# Patient Record
Sex: MALE | Race: WHITE | Employment: FULL TIME | URBAN - METROPOLITAN AREA
[De-identification: names, ages, dates, MRNs, and addresses within clinical notes are randomized per-mention and may not be internally consistent; named-entity substitution may affect disease eponyms.]

---

## 2019-10-14 ENCOUNTER — INITIAL CONSULT (OUTPATIENT)
Dept: PULMONOLOGY | Age: 59
End: 2019-10-14
Payer: COMMERCIAL

## 2019-10-14 VITALS
WEIGHT: 192 LBS | DIASTOLIC BLOOD PRESSURE: 76 MMHG | OXYGEN SATURATION: 94 % | SYSTOLIC BLOOD PRESSURE: 122 MMHG | BODY MASS INDEX: 25.45 KG/M2 | HEIGHT: 73 IN | HEART RATE: 73 BPM

## 2019-10-14 DIAGNOSIS — R91.1 LEFT LOWER LOBE PULMONARY NODULE: ICD-10-CM

## 2019-10-14 DIAGNOSIS — F17.210 CIGARETTE SMOKER: ICD-10-CM

## 2019-10-14 DIAGNOSIS — R06.02 SOB (SHORTNESS OF BREATH): ICD-10-CM

## 2019-10-14 PROCEDURE — 99204 OFFICE O/P NEW MOD 45 MIN: CPT | Performed by: INTERNAL MEDICINE

## 2019-10-14 RX ORDER — SIMVASTATIN 40 MG
TABLET ORAL
Refills: 0 | COMMUNITY
Start: 2019-08-30

## 2019-10-14 RX ORDER — ALFUZOSIN HYDROCHLORIDE 10 MG/1
TABLET, EXTENDED RELEASE ORAL
Refills: 0 | COMMUNITY
Start: 2019-10-09 | End: 2021-05-26

## 2019-10-14 RX ORDER — BUPROPION HYDROCHLORIDE 150 MG/1
TABLET ORAL
Refills: 0 | COMMUNITY
Start: 2019-08-29 | End: 2021-05-26

## 2019-10-14 ASSESSMENT — ENCOUNTER SYMPTOMS
ABDOMINAL DISTENTION: 0
SHORTNESS OF BREATH: 0
EYE DISCHARGE: 0
COUGH: 0
ABDOMINAL PAIN: 0
EYE ITCHING: 0
BACK PAIN: 0

## 2019-11-04 ENCOUNTER — TELEPHONE (OUTPATIENT)
Dept: PULMONOLOGY | Age: 59
End: 2019-11-04

## 2019-11-07 ENCOUNTER — HOSPITAL ENCOUNTER (OUTPATIENT)
Dept: CT IMAGING | Age: 59
Discharge: HOME | End: 2019-11-07
Payer: COMMERCIAL

## 2019-11-07 DIAGNOSIS — R91.1 LEFT LOWER LOBE PULMONARY NODULE: ICD-10-CM

## 2019-11-07 PROCEDURE — 71250 CT THORAX DX C-: CPT

## 2019-11-11 ENCOUNTER — OFFICE VISIT (OUTPATIENT)
Dept: PULMONOLOGY | Age: 59
End: 2019-11-11
Payer: COMMERCIAL

## 2019-11-11 ENCOUNTER — TELEPHONE (OUTPATIENT)
Dept: PULMONOLOGY | Age: 59
End: 2019-11-11

## 2019-11-11 VITALS
DIASTOLIC BLOOD PRESSURE: 78 MMHG | OXYGEN SATURATION: 97 % | HEART RATE: 72 BPM | WEIGHT: 190 LBS | SYSTOLIC BLOOD PRESSURE: 128 MMHG | BODY MASS INDEX: 25.18 KG/M2 | HEIGHT: 73 IN

## 2019-11-11 DIAGNOSIS — R91.1 LEFT LOWER LOBE PULMONARY NODULE: ICD-10-CM

## 2019-11-11 DIAGNOSIS — R06.02 SOB (SHORTNESS OF BREATH): ICD-10-CM

## 2019-11-11 DIAGNOSIS — F17.210 CIGARETTE SMOKER: ICD-10-CM

## 2019-11-11 PROCEDURE — G8484 FLU IMMUNIZE NO ADMIN: HCPCS | Performed by: INTERNAL MEDICINE

## 2019-11-11 PROCEDURE — 99214 OFFICE O/P EST MOD 30 MIN: CPT | Performed by: INTERNAL MEDICINE

## 2019-11-11 PROCEDURE — 4004F PT TOBACCO SCREEN RCVD TLK: CPT | Performed by: INTERNAL MEDICINE

## 2019-11-11 PROCEDURE — G8419 CALC BMI OUT NRM PARAM NOF/U: HCPCS | Performed by: INTERNAL MEDICINE

## 2019-11-11 PROCEDURE — G8427 DOCREV CUR MEDS BY ELIG CLIN: HCPCS | Performed by: INTERNAL MEDICINE

## 2019-11-11 PROCEDURE — 3017F COLORECTAL CA SCREEN DOC REV: CPT | Performed by: INTERNAL MEDICINE

## 2019-11-11 ASSESSMENT — ENCOUNTER SYMPTOMS
COUGH: 1
EYE ITCHING: 0
BACK PAIN: 0
SHORTNESS OF BREATH: 1
ABDOMINAL DISTENTION: 0
ABDOMINAL PAIN: 0
EYE DISCHARGE: 0

## 2020-02-19 ENCOUNTER — HOSPITAL ENCOUNTER (OUTPATIENT)
Dept: PULMONOLOGY | Age: 60
Discharge: HOME | End: 2020-02-19
Payer: COMMERCIAL

## 2020-02-19 LAB
DLCO %PRED: 41 %
DLCO PRED: NORMAL
DLCO/VA %PRED: NORMAL
DLCO/VA PRED: NORMAL
DLCO/VA: NORMAL
DLCO: NORMAL
EXPIRATORY TIME-POST: NORMAL
EXPIRATORY TIME: NORMAL
FEF 25-75% %CHNG: NORMAL
FEF 25-75% %PRED-POST: NORMAL
FEF 25-75% %PRED-PRE: NORMAL
FEF 25-75% PRED: NORMAL
FEF 25-75%-POST: NORMAL
FEF 25-75%-PRE: NORMAL
FEV1 %PRED-POST: 71 %
FEV1 %PRED-PRE: 55 %
FEV1 PRED: NORMAL
FEV1-POST: NORMAL
FEV1-PRE: NORMAL
FEV1/FVC %PRED-POST: NORMAL
FEV1/FVC %PRED-PRE: NORMAL
FEV1/FVC PRED: NORMAL
FEV1/FVC-POST: 91 %
FEV1/FVC-PRE: 74 %
FVC %PRED-POST: NORMAL
FVC %PRED-PRE: NORMAL
FVC PRED: NORMAL
FVC-POST: NORMAL
FVC-PRE: NORMAL
GAW %PRED: NORMAL
GAW PRED: NORMAL
GAW: NORMAL
IC %PRED: NORMAL
IC PRED: NORMAL
IC: NORMAL
MEP: NORMAL
MIP: NORMAL
MVV %PRED-PRE: NORMAL
MVV PRED: NORMAL
MVV-PRE: NORMAL
PEF %PRED-POST: NORMAL
PEF %PRED-PRE: NORMAL
PEF PRED: NORMAL
PEF%CHNG: NORMAL
PEF-POST: NORMAL
PEF-PRE: NORMAL
RAW %PRED: NORMAL
RAW PRED: NORMAL
RAW: NORMAL
RV %PRED: NORMAL
RV PRED: NORMAL
RV: NORMAL
SVC %PRED: NORMAL
SVC PRED: NORMAL
SVC: NORMAL
TLC %PRED: 101 %
TLC PRED: NORMAL
TLC: NORMAL
VA %PRED: NORMAL
VA PRED: NORMAL
VA: NORMAL
VTG %PRED: NORMAL
VTG PRED: NORMAL
VTG: NORMAL

## 2020-02-19 PROCEDURE — 94060 EVALUATION OF WHEEZING: CPT

## 2020-02-19 PROCEDURE — 94727 GAS DIL/WSHOT DETER LNG VOL: CPT

## 2020-02-19 PROCEDURE — 94729 DIFFUSING CAPACITY: CPT

## 2020-02-19 ASSESSMENT — PULMONARY FUNCTION TESTS
FEV1/FVC_PRE: 74
FEV1_PERCENT_PREDICTED_POST: 71
FEV1/FVC_POST: 91
FEV1_PERCENT_PREDICTED_PRE: 55

## 2023-04-20 NOTE — PROGRESS NOTES
IR Procedure at [facility]:  Left message for patient to arrive at 1300 at [facility] on 4/21/2023 for his procedure at 1430. Also went over below instructions. (Paracentesis, Thoracentesis, Thyroid Bx and Injections may have a light breakfast)  2. Follow your directions as prescribed by the doctor for your procedure and medications. 3.   Consult your provider as to when to stop blood thinner  4. Do not take any pain medication within 6 hours of your procedure  5. Do not drink any alcoholic beverages or use any street drugs 24 hours before procedure. 6.   Please wear simple, loose fitting clothing to the hospital.  Do not bring valuables (money,             credit cards, checkbooks, etc.)     7. If you  have a Living Will and Durable Power of  for Healthcare, please bring in a copy. 8.   Please bring picture ID,  insurance card, paperwork from the doctors office            (H & P, Consent,  & card for implantable devices). 9.   Report to the information desk on the ground floor. 10. Take a shower the night before or morning of your procedure, do not apply any lotion, oil or powder. 11. If you are going to be sedated for the procedure, you will need a responsible adult to drive you home.

## 2023-04-21 ENCOUNTER — HOSPITAL ENCOUNTER (OUTPATIENT)
Dept: GENERAL RADIOLOGY | Age: 63
Discharge: HOME | End: 2023-04-21
Payer: COMMERCIAL

## 2023-04-21 ENCOUNTER — HOSPITAL ENCOUNTER (OUTPATIENT)
Dept: INTERVENTIONAL RADIOLOGY/VASCULAR | Age: 63
Discharge: HOME | End: 2023-04-21
Payer: COMMERCIAL

## 2023-04-21 VITALS
TEMPERATURE: 97 F | DIASTOLIC BLOOD PRESSURE: 76 MMHG | BODY MASS INDEX: 23.03 KG/M2 | SYSTOLIC BLOOD PRESSURE: 140 MMHG | OXYGEN SATURATION: 96 % | HEART RATE: 60 BPM | WEIGHT: 170 LBS | RESPIRATION RATE: 18 BRPM | HEIGHT: 72 IN

## 2023-04-21 DIAGNOSIS — J90 PLEURAL EFFUSION: ICD-10-CM

## 2023-04-21 LAB
APTT: 39.8 SECONDS (ref 25.1–37.1)
HCT VFR BLD CALC: 32.9 % (ref 42–52)
HEMOGLOBIN: 10.8 GM/DL (ref 13.5–18)
INR BLD: 1.03 INDEX
MCH RBC QN AUTO: 31 PG (ref 27–31)
MCHC RBC AUTO-ENTMCNC: 32.8 % (ref 32–36)
MCV RBC AUTO: 94.5 FL (ref 78–100)
PDW BLD-RTO: 12.9 % (ref 11.7–14.9)
PLATELET # BLD: 260 K/CU MM (ref 140–440)
PMV BLD AUTO: 9.4 FL (ref 7.5–11.1)
PROTHROMBIN TIME: 13.1 SECONDS (ref 11.7–14.5)
RBC # BLD: 3.48 M/CU MM (ref 4.6–6.2)
WBC # BLD: 6.8 K/CU MM (ref 4–10.5)

## 2023-04-21 PROCEDURE — 32555 ASPIRATE PLEURA W/ IMAGING: CPT

## 2023-04-21 PROCEDURE — 85027 COMPLETE CBC AUTOMATED: CPT

## 2023-04-21 PROCEDURE — 85610 PROTHROMBIN TIME: CPT

## 2023-04-21 PROCEDURE — 6360000002 HC RX W HCPCS

## 2023-04-21 PROCEDURE — 71045 X-RAY EXAM CHEST 1 VIEW: CPT

## 2023-04-21 PROCEDURE — C1729 CATH, DRAINAGE: HCPCS

## 2023-04-21 PROCEDURE — 85730 THROMBOPLASTIN TIME PARTIAL: CPT

## 2023-04-21 RX ORDER — SODIUM CHLORIDE 0.9 % (FLUSH) 0.9 %
10 SYRINGE (ML) INJECTION PRN
Status: DISCONTINUED | OUTPATIENT
Start: 2023-04-21 | End: 2023-04-22 | Stop reason: HOSPADM

## 2023-04-21 ASSESSMENT — PAIN SCALES - GENERAL
PAINLEVEL_OUTOF10: 0

## 2023-04-21 ASSESSMENT — PAIN - FUNCTIONAL ASSESSMENT: PAIN_FUNCTIONAL_ASSESSMENT: 0-10

## 2023-04-21 NOTE — PROGRESS NOTES
1442- Pt returned to SDS rm 18, respirations even and unlabored, VSS, pt alert and oriented, denies further need at this time. Dressing to L back clean, dry and intact   1445- Beverage and crackers provided to pt, tolerating well   [phone]- Radiology notified pt needs CXR   [phone]- Pt Denies pain and nausea at this time   1520- This nurse called radiology and left message for CXR to be completed.    [phone]- Radiology called and stated they were on their way    1550- radiology at bedside completing CXR   [phone]- This nurse attempted to call IR to read CXR   53 [facility] notified this nurse, Dr. Shannon Gross read CXR and gave discharge order   [phone]- Discharge instructions reviewed w/ pt, verbalized understanding, no questions at this time   [phone]- Pt dressing   [phone] Pt discharged

## 2023-04-21 NOTE — OR NURSING
PROCEDURE PERFORMED: left thoracentesis    PRIMARY INDICATION FOR PROCEDURE: pleural effusion    INFORMED CONSENT:  Obtained prior to procedure. Consent placed in chart. ALYCE SCORE PRE PROCEDURE:  10      PT TRANSPORTED FROM:  [facility]                                  TO THE IR ROOM:  IR large room                     ASSESSMENT: WDL    BARRIER PRECAUTIONS & STERILE TECHNIQUE:               Pt arrived to IR large room in bed and will stay in bed for the procedure. Pt placed on Cardiac Monitor. Pt and draped in a sterile fashion with chlorhexadine by Viacna Spangler RN    PAIN/LOCAL ANESTHESIA/SEDATION MANAGEMENT:           Local: Lidocaine 1% given by Dr Harshad Rausch          Sedation:              Fentanyl:             Versed:     INTRAOPERATIVE:           ACCESS TIME: 1418          US/FLUORO: 3 images          WIRE USED:           SHEATH USED:           CATHETER USED: one step          FINAL IMAGE TAKEN TO CONFIRM PLACEMENT OF:           CONTRAST/CC:     STERILE DRESSINGS: Dry sterile dressing was applied to the left back.     SPECIMENS: 700cc of serous sanguineous fluid was sent to the lab. 1100cc of fluid was collected all together     EBL:    less than 1cc      FOLLOW- UP X-RAY: STAT chest X-Ray was ordered    COMPLICATIONS/ OUTCOME: Patient tolerated procedure well without any complications    ALYCE SCORE POST PROCEDURE:   10       REPORT CALLED TO: Bedside report given in [facility]

## 2023-04-21 NOTE — H&P
pain      tiZANidine (ZANAFLEX) 2 MG tablet take 1 tablet by mouth three times a day if needed      simvastatin (ZOCOR) 40 MG tablet take 1 tablet by mouth once daily  0     No current facility-administered medications on file prior to encounter. ROS: No fevers or chills    Vital Signs: Body mass index is 23.06 kg/m². Laboratory:  Recent Labs     04/21/23  1325   WBC 6.8   INR 1.03     INR @LABR24(INR)@    Physical Exam:  GENERAL:Well developed, well nourished in NAD  NECK: Neck exam - No JVD,HJR or carotid bruit, no thyromegaly   RESPIRATORY:Clear to auscultation. Decreased breath sounds on the left. HEART:RRR,no murmer, gallop or friction rub  ABDOMEN: Bowel sounds present, no tenderness to palpation. No organomegaly  EXTREMITIES: no edema or cyanosis  VASCULAR:  no ischemic changes  SKIN: no erythema, rubor or lesions noted  NEURO/PSYCH:Alert and oriented    EKG:    Imaging:    Chest: CTA    Heart: S1, S1    Impression:  Active Problems:    * No active hospital problems. *  Resolved Problems:    * No resolved hospital problems.  *       Left pleural effusion    Mallampati Score 2  ASA class 2    PLAN OF CARE/PLANNED PROCEDURE    IR THORACENTESIS PLEURAL W IMAGING [00809]

## 2023-04-21 NOTE — DISCHARGE INSTRUCTIONS
Watch for signs of infection    Elevated temperature, redness or pain at site,   Drainage at incision,      Advance Care Planning  People with COVID-19 may have no symptoms, mild symptoms, such as fever, cough, and shortness of breath or they may have more severe illness, developing severe and fatal pneumonia. As a result, Advance Care Planning with attention to naming a health care decision maker (someone you trust to make healthcare decisions for you if you could not speak for yourself) and sharing other health care preferences is important BEFORE a possible health crisis. Please contact your Primary Care Provider to discuss Advance Care Planning. Preventing the Spread of Coronavirus Disease 2019 in Homes and Residential Communities  For the most recent information go to [employer].    Prevention steps for People with confirmed or suspected COVID-19 (including persons under investigation) who do not need to be hospitalized  and   People with confirmed COVID-19 who were hospitalized and determined to be medically stable to go home    Your healthcare provider and public health staff will evaluate whether you can be cared for at home. If it is determined that you do not need to be hospitalized and can be isolated at home, you will be monitored by staff from your local or state health department. You should follow the prevention steps below until a healthcare provider or local or state health department says you can return to your normal activities. Stay home except to get medical care  People who are mildly ill with COVID-19 are able to isolate at home during their illness. You should restrict activities outside your home, except for getting medical care. Do not go to work, school, or public areas. Avoid using public transportation, ride-sharing, or taxis.   Separate yourself from other people and animals in your home  People: As much as possible, you

## 2023-07-18 NOTE — PROGRESS NOTES
Patient Name:  He Dior  Patient :  1960  Patient MRN:  [id number]     Primary Oncologist: Nicole Malik MD  Referring Provider: Venu Royal MD     Date of Service: 2023      Reason for Consult:  recurrent clear cell ca     Chief Complaint:    Chief Complaint   Patient presents with    New Patient        Patient Active Problem List:     Cigarette smoker     Left lower lobe pulmonary nodule     SOB (shortness of breath)    HPI:   He Dior is a pleasant 58 yo male patient who was referred for evaluation recurrent clear cell ca. 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 He has been followed by Dr Maddy Silverman locally. 2023 CXR: Moderate left pleural effusion. No pneumothorax. Atelectasis or infiltrate in the left lung base. Underlying mass cannot be entirely excluded. Follow up to resolution is suggested. 2023 Left Pleural Fluid cytology with cell block:   -  Negative for malignancy. -  Benign mesothelial cells, mixed inflammation, and macrophages. Due to left chest pain he has work up. D-dimer was elevated, hence he had CTA chest on 2023 IR lung biopsy:  Final Pathologic Diagnosis:   A. Epicardial fat mass; ultrasound guided needle biopsy:   -     METASTATIC RENAL CELL CARCINOMA. B. Epicardial fat mass; ultrasound guided aspirate for cell block:   -     Mostly blood and rare tumor cells  Fluid cytology from left pleural effusion in progress. Clinically he is not candidate for metastasectomy. He has intermediate prognostic factors. He has Hg  10.4. PS > 80%. WBC, plat, LD and calcium wnl. Recurrrent after >1 year. I will look for clinical trial and order CARIS study. We discuss about preferred choice of treatment based on NCCN. I offer axitinib and pembroluzimab. Potential SE discuss with him.   If there is no recent CT AP, I will order

## 2023-07-21 NOTE — PROGRESS NOTES
IR Procedure at [facility]:  Left message for patient to arrive at [facility] on 7/26/2023 at 1100 for his procedure at 1230. Also went over below instructions. NPO at [address]      2. Follow your directions as prescribed by the doctor for your procedure and medications. 3.   Consult your provider as to when to stop blood thinner  4. Do not take any pain medication within 6 hours of your procedure  5. Do not drink any alcoholic beverages or use any street drugs 24 hours before procedure. 6.   Please wear simple, loose fitting clothing to the hospital.  Do not bring valuables (money,             credit cards, checkbooks, etc.)     7. If you  have a Living Will and Durable Power of  for Healthcare, please bring in a copy. 8.   Please bring picture ID,  insurance card, paperwork from the doctors office            (H & P, Consent,  & card for implantable devices). 9.   Report to the information desk on the ground floor. 10. Take a shower the night before or morning of your procedure, do not apply any lotion, oil or powder. 11. If you are going to be sedated for the procedure, you will need a responsible adult to drive you home.

## 2023-07-26 ENCOUNTER — HOSPITAL ENCOUNTER (OUTPATIENT)
Dept: CT IMAGING | Age: 63
Discharge: HOME | End: 2023-07-26
Payer: COMMERCIAL

## 2023-07-26 ENCOUNTER — HOSPITAL ENCOUNTER (OUTPATIENT)
Dept: GENERAL RADIOLOGY | Age: 63
Discharge: HOME | End: 2023-07-26
Payer: COMMERCIAL

## 2023-07-26 ENCOUNTER — HOSPITAL ENCOUNTER (OUTPATIENT)
Dept: INTERVENTIONAL RADIOLOGY/VASCULAR | Age: 63
Discharge: HOME | End: 2023-07-26
Payer: COMMERCIAL

## 2023-07-26 VITALS
HEART RATE: 90 BPM | BODY MASS INDEX: 21.87 KG/M2 | TEMPERATURE: 97.2 F | WEIGHT: 165 LBS | SYSTOLIC BLOOD PRESSURE: 119 MMHG | DIASTOLIC BLOOD PRESSURE: 70 MMHG | OXYGEN SATURATION: 96 % | HEIGHT: 73 IN | RESPIRATION RATE: 16 BRPM

## 2023-07-26 VITALS
SYSTOLIC BLOOD PRESSURE: 125 MMHG | OXYGEN SATURATION: 98 % | RESPIRATION RATE: 16 BRPM | DIASTOLIC BLOOD PRESSURE: 64 MMHG | HEART RATE: 60 BPM

## 2023-07-26 DIAGNOSIS — R91.8 LUNG MASS: ICD-10-CM

## 2023-07-26 DIAGNOSIS — R91.1 LUNG NODULE: ICD-10-CM

## 2023-07-26 LAB
APTT: 33.8 SECONDS (ref 25.1–37.1)
HCT VFR BLD CALC: 31.9 % (ref 42–52)
HEMOGLOBIN: 10.1 GM/DL (ref 13.5–18)
INR BLD: 1.1 INDEX
MCH RBC QN AUTO: 30.1 PG (ref 27–31)
MCHC RBC AUTO-ENTMCNC: 31.7 % (ref 32–36)
MCV RBC AUTO: 94.9 FL (ref 78–100)
PDW BLD-RTO: 13 % (ref 11.7–14.9)
PLATELET # BLD: 248 K/CU MM (ref 140–440)
PMV BLD AUTO: 9.3 FL (ref 7.5–11.1)
PROTHROMBIN TIME: 14.8 SECONDS (ref 11.7–14.5)
RBC # BLD: 3.36 M/CU MM (ref 4.6–6.2)
WBC # BLD: 6.2 K/CU MM (ref 4–10.5)

## 2023-07-26 PROCEDURE — 2500000003 HC RX 250 WO HCPCS: Performed by: RADIOLOGY

## 2023-07-26 PROCEDURE — C1729 CATH, DRAINAGE: HCPCS

## 2023-07-26 PROCEDURE — 71045 X-RAY EXAM CHEST 1 VIEW: CPT

## 2023-07-26 PROCEDURE — 32555 ASPIRATE PLEURA W/ IMAGING: CPT

## 2023-07-26 PROCEDURE — 88305 TISSUE EXAM BY PATHOLOGIST: CPT | Performed by: PATHOLOGY

## 2023-07-26 PROCEDURE — 85730 THROMBOPLASTIN TIME PARTIAL: CPT

## 2023-07-26 PROCEDURE — 88333 PATH CONSLTJ SURG CYTO XM 1: CPT | Performed by: PATHOLOGY

## 2023-07-26 PROCEDURE — 88342 IMHCHEM/IMCYTCHM 1ST ANTB: CPT | Performed by: PATHOLOGY

## 2023-07-26 PROCEDURE — 32408 CORE NDL BX LNG/MED PERQ: CPT

## 2023-07-26 PROCEDURE — 88341 IMHCHEM/IMCYTCHM EA ADD ANTB: CPT | Performed by: PATHOLOGY

## 2023-07-26 PROCEDURE — 85610 PROTHROMBIN TIME: CPT

## 2023-07-26 PROCEDURE — 85027 COMPLETE CBC AUTOMATED: CPT

## 2023-07-26 PROCEDURE — 88173 CYTOPATH EVAL FNA REPORT: CPT | Performed by: PATHOLOGY

## 2023-07-26 RX ORDER — LIDOCAINE HYDROCHLORIDE 10 MG/ML
INJECTION, SOLUTION EPIDURAL; INFILTRATION; INTRACAUDAL; PERINEURAL PRN
Status: COMPLETED | OUTPATIENT
Start: 2023-07-26 | End: 2023-07-26

## 2023-07-26 RX ORDER — TRAMADOL HYDROCHLORIDE 50 MG/1
50 TABLET ORAL EVERY 6 HOURS PRN
COMMUNITY

## 2023-07-26 RX ADMIN — LIDOCAINE HYDROCHLORIDE 10 MG: 10 INJECTION, SOLUTION EPIDURAL; INFILTRATION; INTRACAUDAL; PERINEURAL at 13:29

## 2023-07-26 RX ADMIN — LIDOCAINE HYDROCHLORIDE 10 MG: 10 INJECTION, SOLUTION EPIDURAL; INFILTRATION; INTRACAUDAL; PERINEURAL at 13:56

## 2023-07-26 ASSESSMENT — PAIN SCALES - GENERAL
PAINLEVEL_OUTOF10: 0
PAINLEVEL_OUTOF10: 0

## 2023-07-26 ASSESSMENT — PAIN - FUNCTIONAL ASSESSMENT: PAIN_FUNCTIONAL_ASSESSMENT: 0-10

## 2023-07-26 NOTE — PROGRESS NOTES
1410 Pt. Brought back to unit, bedside report received from [address] Pt. Denies pain, SOB, or chest pain. VSS and bandaid to L front chest Is C/D/I. 1412 Pt. Provided with beverage and crackers. 1415 Chest xray obtained. 56 Dr. Anyia Harrington calledasad for pt. To DC home. Sarah instructions reviewed with pt, pt expresses understanding. 1500 Pt. To DC home in private vehicle with brother in law.

## 2023-07-26 NOTE — PROGRESS NOTES
Left lung biopsy complete by Dr. Angel Wilson, specimen sent to lab with cytology. Bandaid in place clean dry and intact.

## 2023-07-26 NOTE — PROGRESS NOTES
Thoracentesis complete by Dr. Mcgill Horse, 950 cc blood tinged pleural fluid removed. Tegaderm in place clean dry and intact. Pt tolerated procedure well, VSS STAT chest x ray order placed.

## 2023-07-26 NOTE — DISCHARGE INSTRUCTIONS
Thoracentesis: What to Expect at Home  Your Recovery    Thoracentesis (say \"manj-nz-pli-JORGE-sis\") is a procedure to remove fluid from the space between the lungs and the chest wall (pleural space). This procedure may also be called a \"chest tap. \" It is normal to have a small amount of fluid in the pleural space. But too much fluid can build up because of problems such as infection, heart failure, or lung cancer. The procedure may have been done to help with shortness of breath and pain caused by the fluid buildup. Or you may have had this procedure so the doctor could test the fluid to find the cause of the buildup. Your chest may be sore where the doctor put the needle or catheter into your skin (the puncture site). This usually gets better after a day or two. You can go back to work or your normal activities as soon as you feel up to it. If the doctor sent the fluid to a lab for testing, it may take several days to get the results. The doctor or nurse will discuss the results with you. This care sheet gives you a general idea about how long it will take for you to recover. But each person recovers at a different pace. Follow the steps below to feel better as quickly as possible. How can you care for yourself at home? Activity    Rest when you feel tired. Getting enough sleep will help you recover. Avoid strenuous activities, such as bicycle riding, jogging, weight lifting, or aerobic exercise, until your doctor says it is okay. You may shower. Do not take a bath until the puncture site has healed, or until your doctor tells you it is okay. Ask your doctor when you can drive again. You may need to take 1 or 2 days off from work. It depends on the type of work you do and how you feel. Diet    You can eat your normal diet. Drink plenty of fluids (unless your doctor tells you not to). Medicines    Take pain medicines exactly as directed.   If the doctor gave you a prescription medicine for pain, take it

## 2023-08-01 ENCOUNTER — HOSPITAL ENCOUNTER (OUTPATIENT)
Dept: INFUSION THERAPY | Age: 63
Discharge: HOME | End: 2023-08-01
Payer: COMMERCIAL

## 2023-08-01 ENCOUNTER — INITIAL CONSULT (OUTPATIENT)
Dept: ONCOLOGY | Age: 63
End: 2023-08-01
Payer: COMMERCIAL

## 2023-08-01 VITALS
WEIGHT: 155 LBS | BODY MASS INDEX: 20.54 KG/M2 | HEART RATE: 69 BPM | TEMPERATURE: 97.7 F | HEIGHT: 73 IN | SYSTOLIC BLOOD PRESSURE: 111 MMHG | DIASTOLIC BLOOD PRESSURE: 65 MMHG | OXYGEN SATURATION: 97 %

## 2023-08-01 DIAGNOSIS — C64.2 RENAL CELL CARCINOMA OF LEFT KIDNEY (HCC): ICD-10-CM

## 2023-08-01 DIAGNOSIS — Z11.59 SCREENING FOR VIRAL DISEASE: ICD-10-CM

## 2023-08-01 DIAGNOSIS — C64.2 RENAL CELL CARCINOMA OF LEFT KIDNEY (HCC): Primary | ICD-10-CM

## 2023-08-01 DIAGNOSIS — R53.82 CHRONIC FATIGUE: ICD-10-CM

## 2023-08-01 LAB
ALBUMIN SERPL-MCNC: 4.2 GM/DL (ref 3.4–5)
ALP BLD-CCNC: 96 IU/L (ref 40–129)
ALT SERPL-CCNC: 22 U/L (ref 10–40)
ANION GAP SERPL CALCULATED.3IONS-SCNC: 11 MMOL/L (ref 4–16)
AST SERPL-CCNC: 22 IU/L (ref 15–37)
BASOPHILS ABSOLUTE: 0.1 K/CU MM
BASOPHILS RELATIVE PERCENT: 0.9 % (ref 0–1)
BILIRUB SERPL-MCNC: 0.2 MG/DL (ref 0–1)
BUN SERPL-MCNC: 20 MG/DL (ref 6–23)
CALCIUM SERPL-MCNC: 9.3 MG/DL (ref 8.3–10.6)
CHLORIDE BLD-SCNC: 103 MMOL/L (ref 99–110)
CO2: 25 MMOL/L (ref 21–32)
CREAT SERPL-MCNC: 1.2 MG/DL (ref 0.9–1.3)
DIFFERENTIAL TYPE: ABNORMAL
EOSINOPHILS ABSOLUTE: 0.2 K/CU MM
EOSINOPHILS RELATIVE PERCENT: 3.8 % (ref 0–3)
GFR SERPL CREATININE-BSD FRML MDRD: >60 ML/MIN/1.73M2
GLUCOSE SERPL-MCNC: 106 MG/DL (ref 70–99)
HCT VFR BLD CALC: 32.8 % (ref 42–52)
HEMOGLOBIN: 10.4 GM/DL (ref 13.5–18)
LACTATE DEHYDROGENASE: 149 IU/L (ref 120–246)
LYMPHOCYTES ABSOLUTE: 1.8 K/CU MM
LYMPHOCYTES RELATIVE PERCENT: 28.4 % (ref 24–44)
MCH RBC QN AUTO: 29.6 PG (ref 27–31)
MCHC RBC AUTO-ENTMCNC: 31.7 % (ref 32–36)
MCV RBC AUTO: 93.4 FL (ref 78–100)
MONOCYTES ABSOLUTE: 0.7 K/CU MM
MONOCYTES RELATIVE PERCENT: 10.2 % (ref 0–4)
PDW BLD-RTO: 13 % (ref 11.7–14.9)
PLATELET # BLD: 240 K/CU MM (ref 140–440)
PMV BLD AUTO: 8.8 FL (ref 7.5–11.1)
POTASSIUM SERPL-SCNC: 4.5 MMOL/L (ref 3.5–5.1)
RBC # BLD: 3.51 M/CU MM (ref 4.6–6.2)
SEGMENTED NEUTROPHILS ABSOLUTE COUNT: 3.6 K/CU MM
SEGMENTED NEUTROPHILS RELATIVE PERCENT: 56.7 % (ref 36–66)
SODIUM BLD-SCNC: 139 MMOL/L (ref 135–145)
TOTAL PROTEIN: 6.7 GM/DL (ref 6.4–8.2)
WBC # BLD: 6.4 K/CU MM (ref 4–10.5)

## 2023-08-01 PROCEDURE — 99205 OFFICE O/P NEW HI 60 MIN: CPT | Performed by: INTERNAL MEDICINE

## 2023-08-01 PROCEDURE — 83615 LACTATE (LD) (LDH) ENZYME: CPT

## 2023-08-01 PROCEDURE — G8427 DOCREV CUR MEDS BY ELIG CLIN: HCPCS | Performed by: INTERNAL MEDICINE

## 2023-08-01 PROCEDURE — 85025 COMPLETE CBC W/AUTO DIFF WBC: CPT

## 2023-08-01 PROCEDURE — 36415 COLL VENOUS BLD VENIPUNCTURE: CPT

## 2023-08-01 PROCEDURE — 80053 COMPREHEN METABOLIC PANEL: CPT

## 2023-08-01 PROCEDURE — 1036F TOBACCO NON-USER: CPT | Performed by: INTERNAL MEDICINE

## 2023-08-01 PROCEDURE — G8420 CALC BMI NORM PARAMETERS: HCPCS | Performed by: INTERNAL MEDICINE

## 2023-08-01 PROCEDURE — 3017F COLORECTAL CA SCREEN DOC REV: CPT | Performed by: INTERNAL MEDICINE

## 2023-08-01 PROCEDURE — 99211 OFF/OP EST MAY X REQ PHY/QHP: CPT

## 2023-08-01 RX ORDER — BACLOFEN 10 MG/1
10 TABLET ORAL 2 TIMES DAILY PRN
COMMUNITY
Start: 2023-07-19

## 2023-08-01 RX ORDER — FAMOTIDINE 10 MG/ML
20 INJECTION, SOLUTION INTRAVENOUS
OUTPATIENT
Start: 2023-08-08

## 2023-08-01 RX ORDER — DIPHENHYDRAMINE HYDROCHLORIDE 50 MG/ML
50 INJECTION INTRAMUSCULAR; INTRAVENOUS
OUTPATIENT
Start: 2023-08-08

## 2023-08-01 RX ORDER — HEPARIN SODIUM (PORCINE) LOCK FLUSH IV SOLN 100 UNIT/ML 100 UNIT/ML
500 SOLUTION INTRAVENOUS PRN
OUTPATIENT
Start: 2023-08-08

## 2023-08-01 RX ORDER — SODIUM CHLORIDE 9 MG/ML
INJECTION, SOLUTION INTRAVENOUS CONTINUOUS
OUTPATIENT
Start: 2023-08-08

## 2023-08-01 RX ORDER — ONDANSETRON 2 MG/ML
8 INJECTION INTRAMUSCULAR; INTRAVENOUS
OUTPATIENT
Start: 2023-08-08

## 2023-08-01 RX ORDER — AXITINIB 5 MG/1
5 TABLET, FILM COATED ORAL EVERY 12 HOURS
Qty: 60 TABLET | Refills: 5 | Status: SHIPPED | OUTPATIENT
Start: 2023-08-01

## 2023-08-01 RX ORDER — MEPERIDINE HYDROCHLORIDE 50 MG/ML
12.5 INJECTION INTRAMUSCULAR; INTRAVENOUS; SUBCUTANEOUS PRN
OUTPATIENT
Start: 2023-08-08

## 2023-08-01 RX ORDER — EPINEPHRINE 1 MG/ML
0.3 INJECTION, SOLUTION, CONCENTRATE INTRAVENOUS PRN
OUTPATIENT
Start: 2023-08-08

## 2023-08-01 RX ORDER — SODIUM CHLORIDE 0.9 % (FLUSH) 0.9 %
5-40 SYRINGE (ML) INJECTION PRN
OUTPATIENT
Start: 2023-08-08

## 2023-08-01 RX ORDER — ALBUTEROL SULFATE 90 UG/1
4 AEROSOL, METERED RESPIRATORY (INHALATION) PRN
OUTPATIENT
Start: 2023-08-08

## 2023-08-01 RX ORDER — SODIUM CHLORIDE 9 MG/ML
5-250 INJECTION, SOLUTION INTRAVENOUS PRN
OUTPATIENT
Start: 2023-08-08

## 2023-08-01 RX ORDER — ACETAMINOPHEN 325 MG/1
650 TABLET ORAL
OUTPATIENT
Start: 2023-08-08

## 2023-08-01 ASSESSMENT — PATIENT HEALTH QUESTIONNAIRE - PHQ9
2. FEELING DOWN, DEPRESSED OR HOPELESS: 0
SUM OF ALL RESPONSES TO PHQ9 QUESTIONS 1 & 2: 0
SUM OF ALL RESPONSES TO PHQ QUESTIONS 1-9: 0
1. LITTLE INTEREST OR PLEASURE IN DOING THINGS: 0
SUM OF ALL RESPONSES TO PHQ QUESTIONS 1-9: 0

## 2023-08-01 NOTE — PROGRESS NOTES
MA Rooming Questions  Patient: Lindsay Villanueva  MRN: [id number]    Date: 8/1/2023        New Patient        5. Did the patient have a depression screening completed today?  Yes    PHQ-9 Total Score: 0 (8/1/2023  2:41 PM)       PHQ-9 Given to (if applicable):               PHQ-9 Score (if applicable):                     [] Positive     [x]  Negative              Does question #9 need addressed (if applicable)                     [] Yes    []  No               Saulo Rose CMA

## 2023-08-02 ENCOUNTER — CLINICAL DOCUMENTATION (OUTPATIENT)
Dept: ONCOLOGY | Age: 63
End: 2023-08-02

## 2023-08-02 DIAGNOSIS — C64.2 RENAL CELL CARCINOMA OF LEFT KIDNEY (HCC): Primary | ICD-10-CM

## 2023-08-03 ENCOUNTER — TELEPHONE (OUTPATIENT)
Dept: ONCOLOGY | Age: 63
End: 2023-08-03

## 2023-08-03 NOTE — TELEPHONE ENCOUNTER
8/3/23 - faxed CT orders and office notes to [facility] for scheduling - successful fax

## 2023-08-07 ENCOUNTER — TELEPHONE (OUTPATIENT)
Facility: HOSPITAL | Age: 63
End: 2023-08-07

## 2023-08-07 ENCOUNTER — CLINICAL DOCUMENTATION (OUTPATIENT)
Dept: ONCOLOGY | Age: 63
End: 2023-08-07

## 2023-08-07 NOTE — PROGRESS NOTES
At physicians request pt chart was reviewed for potential clinical trial matches, pt may be eligible for [place] [id number] per [facility], this nurse attempted to contact pt @ provided # [phone] to inform him of potential trial availability and discuss ICF process, unable to speak w/ pt, able to LVM detailing reason for call, direct contact information provided w/ request for return call

## 2023-08-07 NOTE — TELEPHONE ENCOUNTER
Financial Navigator called patient to conduct a financial screening for recently ordered therapy. Received no answer, left message requesting a call back. Direct contact info provided.

## 2023-08-08 ENCOUNTER — CLINICAL DOCUMENTATION (OUTPATIENT)
Dept: ONCOLOGY | Age: 63
End: 2023-08-08

## 2023-08-08 NOTE — PROGRESS NOTES
Pt returned call regarding clinical trial participation, during discussion pt unclear r/t diagnosis details and current plan of care, physician consulted, per doctor direction pt placed on schedule for 2:45 pm for follow-up OV, this nurse communicated above details to patient, he's agreeable to plan and states he will have his CT AP tomorrow 08/09/23 @ 12:30 at [facility], encouraged to bring any questions/concerns with him for discussion @ tomorrows appointment, voiced understanding, confirmed appointment time, denied additional needs at this time

## 2023-08-09 ENCOUNTER — OFFICE VISIT (OUTPATIENT)
Dept: ONCOLOGY | Age: 63
End: 2023-08-09
Payer: COMMERCIAL

## 2023-08-09 ENCOUNTER — HOSPITAL ENCOUNTER (OUTPATIENT)
Dept: INFUSION THERAPY | Age: 63
Discharge: HOME | End: 2023-08-09
Payer: COMMERCIAL

## 2023-08-09 VITALS
WEIGHT: 153 LBS | HEART RATE: 64 BPM | HEIGHT: 73 IN | OXYGEN SATURATION: 96 % | BODY MASS INDEX: 20.28 KG/M2 | DIASTOLIC BLOOD PRESSURE: 64 MMHG | SYSTOLIC BLOOD PRESSURE: 135 MMHG | TEMPERATURE: 98.2 F | RESPIRATION RATE: 16 BRPM

## 2023-08-09 DIAGNOSIS — D64.9 ANEMIA, UNSPECIFIED TYPE: ICD-10-CM

## 2023-08-09 DIAGNOSIS — Z11.59 SCREENING FOR VIRAL DISEASE: ICD-10-CM

## 2023-08-09 DIAGNOSIS — R53.83 OTHER FATIGUE: Primary | ICD-10-CM

## 2023-08-09 PROCEDURE — G8420 CALC BMI NORM PARAMETERS: HCPCS | Performed by: INTERNAL MEDICINE

## 2023-08-09 PROCEDURE — G8427 DOCREV CUR MEDS BY ELIG CLIN: HCPCS | Performed by: INTERNAL MEDICINE

## 2023-08-09 PROCEDURE — 1036F TOBACCO NON-USER: CPT | Performed by: INTERNAL MEDICINE

## 2023-08-09 PROCEDURE — 99214 OFFICE O/P EST MOD 30 MIN: CPT | Performed by: INTERNAL MEDICINE

## 2023-08-09 PROCEDURE — 3017F COLORECTAL CA SCREEN DOC REV: CPT | Performed by: INTERNAL MEDICINE

## 2023-08-09 PROCEDURE — 99211 OFF/OP EST MAY X REQ PHY/QHP: CPT

## 2023-08-09 NOTE — PROGRESS NOTES
MA Rooming Questions  Patient: Lindsay Villanueva  MRN: [id number]    Date: 8/9/2023        1. Do you have any new issues? Yes- has some questions         2. Do you need any refills on medications?    no    3. Have you had any imaging done since your last visit?   no    4. Have you been hospitalized or seen in the emergency room since your last visit here?   no    5. Did the patient have a depression screening completed today?  No    No data recorded     PHQ-9 Given to (if applicable):               PHQ-9 Score (if applicable):                     [] Positive     []  Negative              Does question #9 need addressed (if applicable)                     [] Yes    []  No               Aren Vo CMA
imaging:  CT or MRI imaging to assess baseline pretreatment or prior to observation  Follow-up imaging every 6-16 weeks as per physician discretion, patient clinical status, and therapeutic schedule. Imaging interval to be  adjusted shorter or longer according to rate of disease change and sites of active disease   Consider MRI (preferred) or CT of head at baseline and as clinically indicated. Annual surveillance scans at physician discretion   MRI of spine as clinically indicated   Bone scan as clinically indicated     Assessment & Plan:  1. He was referred for evaluation recurrent clear cell ca with involvement of the left lung and ? Malignant pleural effusion. 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 CTA chest on 7/14/2023 at [facility] reviewed as above. 7/26/2023 IR lung biopsy: METASTATIC RENAL CELL CARCINOMA. Fluid cytology from left pleural effusion negative for malignancy. Clinically he is not candidate for metastasectomy. He has intermediate prognostic factors. I will look for clinical trial and order CARIS study. We discuss about preferred choice of treatment based on NCCN.  8/9/2023 CT AP: no evidence of recurrent tumor within the abdomen. He is interested in clinical trial and will talk to our research nurse today. 2. He has chronic anemia. 8/1/2023 WBC 6.4, Hg 10.4, plat 240. LFTs wnl. . Creat 1.2. I will order anemic w/u with next OV. RTC in 3 weeks or sooner. I have discussed the above stated plan with the patient and they verbalized understanding and agreed with the plan. Thank you for allowing us to participate in this patient's care.

## 2023-08-10 ENCOUNTER — CLINICAL DOCUMENTATION (OUTPATIENT)
Dept: ONCOLOGY | Age: 63
End: 2023-08-10

## 2023-08-10 ENCOUNTER — HOSPITAL ENCOUNTER (OUTPATIENT)
Dept: INFUSION THERAPY | Age: 63
Discharge: HOME | End: 2023-08-10
Payer: COMMERCIAL

## 2023-08-10 DIAGNOSIS — Z11.59 ENCOUNTER FOR HEPATITIS C SCREENING TEST FOR LOW RISK PATIENT: ICD-10-CM

## 2023-08-10 DIAGNOSIS — C64.9 PRIMARY MALIGNANT NEOPLASM OF KIDNEY WITH METASTASIS FROM KIDNEY TO OTHER SITE, UNSPECIFIED LATERALITY (HCC): ICD-10-CM

## 2023-08-10 DIAGNOSIS — R53.83 OTHER FATIGUE: ICD-10-CM

## 2023-08-10 DIAGNOSIS — C64.9 PRIMARY MALIGNANT NEOPLASM OF KIDNEY WITH METASTASIS FROM KIDNEY TO OTHER SITE, UNSPECIFIED LATERALITY (HCC): Primary | ICD-10-CM

## 2023-08-10 DIAGNOSIS — Z11.59 SCREENING FOR VIRAL DISEASE: ICD-10-CM

## 2023-08-10 DIAGNOSIS — C64.2 RENAL CELL CARCINOMA OF LEFT KIDNEY (HCC): ICD-10-CM

## 2023-08-10 LAB
ALBUMIN SERPL-MCNC: 3.9 GM/DL (ref 3.4–5)
ALP BLD-CCNC: 94 IU/L (ref 40–128)
ALT SERPL-CCNC: 20 U/L (ref 10–40)
ANION GAP SERPL CALCULATED.3IONS-SCNC: 11 MMOL/L (ref 4–16)
AST SERPL-CCNC: 20 IU/L (ref 15–37)
BACTERIA: NEGATIVE /HPF
BASOPHILS ABSOLUTE: 0.1 K/CU MM
BASOPHILS RELATIVE PERCENT: 0.9 % (ref 0–1)
BILIRUB SERPL-MCNC: 0.2 MG/DL (ref 0–1)
BILIRUBIN URINE: NEGATIVE MG/DL
BLOOD, URINE: NEGATIVE
BUN SERPL-MCNC: 22 MG/DL (ref 6–23)
CALCIUM SERPL-MCNC: 9.1 MG/DL (ref 8.3–10.6)
CHLORIDE BLD-SCNC: 103 MMOL/L (ref 99–110)
CLARITY: CLEAR
CO2: 25 MMOL/L (ref 21–32)
COLOR: YELLOW
CORTISOL, PLASMA: 6.39
CREAT SERPL-MCNC: 1.4 MG/DL (ref 0.9–1.3)
DIFFERENTIAL TYPE: ABNORMAL
EOSINOPHILS ABSOLUTE: 0.2 K/CU MM
EOSINOPHILS RELATIVE PERCENT: 2.9 % (ref 0–3)
GFR SERPL CREATININE-BSD FRML MDRD: 56 ML/MIN/1.73M2
GLUCOSE SERPL-MCNC: 114 MG/DL (ref 70–99)
GLUCOSE, URINE: NEGATIVE MG/DL
HBV SURFACE AB SERPL IA-ACNC: <3.5 M[IU]/ML
HCT VFR BLD CALC: 30.6 % (ref 42–52)
HEMOGLOBIN: 9.7 GM/DL (ref 13.5–18)
HYALINE CASTS: 0 /LPF
KETONES, URINE: NEGATIVE MG/DL
LEUKOCYTE ESTERASE, URINE: NEGATIVE
LYMPHOCYTES ABSOLUTE: 1.4 K/CU MM
LYMPHOCYTES RELATIVE PERCENT: 25.5 % (ref 24–44)
MCH RBC QN AUTO: 29.8 PG (ref 27–31)
MCHC RBC AUTO-ENTMCNC: 31.7 % (ref 32–36)
MCV RBC AUTO: 93.9 FL (ref 78–100)
MONOCYTES ABSOLUTE: 0.5 K/CU MM
MONOCYTES RELATIVE PERCENT: 8.9 % (ref 0–4)
MUCUS: ABNORMAL HPF
NITRITE URINE, QUANTITATIVE: NEGATIVE
PDW BLD-RTO: 13 % (ref 11.7–14.9)
PH, URINE: 5.5 (ref 5–8)
PLATELET # BLD: 266 K/CU MM (ref 140–440)
PMV BLD AUTO: 9.2 FL (ref 7.5–11.1)
POTASSIUM SERPL-SCNC: 4.9 MMOL/L (ref 3.5–5.1)
PROTEIN UA: ABNORMAL MG/DL
RBC # BLD: 3.26 M/CU MM (ref 4.6–6.2)
RBC URINE: 1 /HPF (ref 0–3)
SEGMENTED NEUTROPHILS ABSOLUTE COUNT: 3.4 K/CU MM
SEGMENTED NEUTROPHILS RELATIVE PERCENT: 61.8 % (ref 36–66)
SODIUM BLD-SCNC: 139 MMOL/L (ref 135–145)
SPECIFIC GRAVITY UA: >1.03 (ref 1–1.03)
TOTAL PROTEIN: 6.5 GM/DL (ref 6.4–8.2)
TRICHOMONAS: ABNORMAL /HPF
TROPONIN T: <0.01 NG/ML
TSH SERPL DL<=0.005 MIU/L-ACNC: 2.18 UIU/ML (ref 0.27–4.2)
UROBILINOGEN, URINE: 0.2 MG/DL (ref 0.2–1)
WBC # BLD: 5.5 K/CU MM (ref 4–10.5)
WBC UA: 1 /HPF (ref 0–2)

## 2023-08-10 PROCEDURE — 84443 ASSAY THYROID STIM HORMONE: CPT

## 2023-08-10 PROCEDURE — 84484 ASSAY OF TROPONIN QUANT: CPT

## 2023-08-10 PROCEDURE — 87340 HEPATITIS B SURFACE AG IA: CPT

## 2023-08-10 PROCEDURE — 86706 HEP B SURFACE ANTIBODY: CPT

## 2023-08-10 PROCEDURE — 82533 TOTAL CORTISOL: CPT

## 2023-08-10 PROCEDURE — 86376 MICROSOMAL ANTIBODY EACH: CPT

## 2023-08-10 PROCEDURE — 80053 COMPREHEN METABOLIC PANEL: CPT

## 2023-08-10 PROCEDURE — 81001 URINALYSIS AUTO W/SCOPE: CPT

## 2023-08-10 PROCEDURE — 85025 COMPLETE CBC W/AUTO DIFF WBC: CPT

## 2023-08-10 PROCEDURE — 86704 HEP B CORE ANTIBODY TOTAL: CPT

## 2023-08-10 PROCEDURE — 36415 COLL VENOUS BLD VENIPUNCTURE: CPT

## 2023-08-10 NOTE — PROGRESS NOTES
Pt presented to clinic 08/09/2023 @ 2:45 pm for treatment planning discussion, Dr. Candis Martinez spoke with patient concerning his current disease status including but not limited to stage, histology, behavior, and treatment options, [place] [id number] was presented to patient for potential enrollment, pt questions were answered, pt verbalized interest in clinical trial participation and stated he would like to proceed with eligibility confirmation. This nurse provided private consultation space to review HIPAA and ICF for [place] [id number], trial was discussed at length and in detail, approximately 1.25 hours were spent w/ patient in total, HIPAA and ICF were signed Wednesday 08/09/2023 @ approximately 3:45 pm, orders placed per eligibility requirements, once results are received official eligibility check will be submitted to [facility] for review &  confirmation.     Signed copies of HIPAA and ICF provided to patient

## 2023-08-11 LAB — HBV SURFACE AG SERPL QL IA: NON REACTIVE

## 2023-08-12 LAB — HBV CORE AB SERPL QL IA: NEGATIVE

## 2023-08-13 LAB
Lab: NORMAL
TEST NAME: NORMAL

## 2023-08-14 ENCOUNTER — TELEPHONE (OUTPATIENT)
Dept: ONCOLOGY | Age: 63
End: 2023-08-14

## 2023-08-14 ENCOUNTER — CLINICAL DOCUMENTATION (OUTPATIENT)
Dept: ONCOLOGY | Age: 63
End: 2023-08-14

## 2023-08-14 NOTE — PROGRESS NOTES
ELIGIBILITY CHECK    Protocol:        [place] [id number]  Title:               PD-inhibitor (Nivolumab) & Ipilimumab followed by nivolumab vs. VEGF TKI cabozantinib with nivolumab:  A Phase III Trial in metastatic untreated Renal Cell Cancer  Consent:        Signed 08/09/2023  HIPAA:           Signed 08/09/2023  Versions:       02/23/2023  Diagnosis:     Metastatic renal cell carcinoma    Tx Plan:                 QOL Study:  YES - patient consented  New Biopsy: NO - patient did NOT consent  Samples for known future studies:  YES - patient consented  Samples for unknown future studies:  YES - patient consented  Contact for future research:  YES - patient consented        Protocol:        DCP-001  Title:               Use of a Clinical Screening Tool to Address Cancer Health Disparities in the [facility] [address] [place]  Consent:        Copy provided for review - patient interested but deferred until next OV  HIPAA:           Copy provided for review  - patient interested but deferred until next OV  Versions:       06/25/2023

## 2023-08-14 NOTE — PROGRESS NOTES
8/14/23  [facility] only does EKG's on their surgical patients. I will schedule at [facility]

## 2023-08-14 NOTE — PROGRESS NOTES
ELIGIBILITY DOCUMENTATION    Pt identified as potential candidate for [place] [id number] due to recent mRCC diagnosis, pt is a 57 y.o. male, : 56, baseline VS per 23 OV note - Wt: 153 lbs (64.9 kg) - Ht: 6'1\" - BP: 135/64 - HR: 64 - Temp: 98.2 - Respiration: 16 - Sp02: 96%    12 [facility] - pathology (+)ve for renal cell carcinoma; clear type, most recently pt underwent US guided Bx of epicardial fat mass completed 23 & reported 23 - pathology confirmed metastatic renal cell carcinoma, clear cell type    pt has measurable disease per CTA completed 23 - Baseline RECIST 1.1 form completed, pt determined to be intermediate risk per IMDC criteria documented per 23 & 23 OV note - 08/10/23 Hgb 9.7 (13.5-18)    pt has Karnofsky score above 70% - pt able to complete all ADL's independently & without difficulty, he continues to work and care for his home, pt has NOT received any prior systemic therapy or radiation for renal cell diagnosis, pt denied active autoimmune disease, pt denied Hx of known reaction or hypersensitivity to chimeric or humanized antibodies, pt has no Hx of hepatitis B/C - TB - HIV and does not take immunosuppressive medications, Hx negative for adrenal insufficiency & uncontrolled HTN (23: 135/64), no major surgery or non-healing wound - ulcer - or bone fractures within past 28 days, Hx negative for history of bleeding or clotting events, Hx negative for GI disorders, TSH WNL on 08/10/23: 2.18 (0.270-4.20), denies Hx of organ transplant/pancreatitis/congential QT syndrome, pt not on active anticoagulation therapy, Hx negative for STEMI/NSTEMI    LABORATORY VALUES - 08/10/23  ANC: 8112   Platelets: 001 (335-333)  Hemoglobin: 9.7 (13.5-18)  Calc.  Creatinine Clearance: 52 ml/min  Urine protein: reported as trace   Total Bilirubin: 0.2 (0.0-1.0)  AST: 20 (15-37)  ALT: 20  (10-40)    Documents faxed to [employer]

## 2023-08-14 NOTE — TELEPHONE ENCOUNTER
8/14/23 - left pt vm for the EKG on 8/18/23 at [facility] arrival time of 11:40 am. Bring a list of meds , insurance cards and ID.  I left my direct line for the pt to call back and confirm

## 2023-08-15 ENCOUNTER — TELEPHONE (OUTPATIENT)
Dept: ONCOLOGY | Age: 63
End: 2023-08-15

## 2023-08-15 DIAGNOSIS — C64.9 METASTATIC RENAL CELL CARCINOMA, UNSPECIFIED LATERALITY (HCC): ICD-10-CM

## 2023-08-15 DIAGNOSIS — C64.2 RENAL CELL CARCINOMA OF LEFT KIDNEY (HCC): ICD-10-CM

## 2023-08-15 DIAGNOSIS — C79.51 METASTATIC RENAL CELL CARCINOMA TO BONE (HCC): Primary | ICD-10-CM

## 2023-08-15 DIAGNOSIS — C64.9 METASTATIC RENAL CELL CARCINOMA TO BONE (HCC): Primary | ICD-10-CM

## 2023-08-15 NOTE — PROGRESS NOTES
Attempted to contact pt @ provided phone number [phone] to inform pt additional imaging had been ordered to complete workup, due to invasion/erosion of left lateral 9th rib per CTA Chest completed 07/14/23 bone scan order placed for additional evaluation of skeletal disease, this nurse was unable to speak w/ patient - no answer, unable to leave .

## 2023-08-15 NOTE — TELEPHONE ENCOUNTER
8/15/23 - spoke w/ pt for the 8/17/23 stat Bone scan at [facility] arrival time of 9:30 for the injection . Pt will go home and back to the hospital at 1:45 for the bone scan.

## 2023-08-16 ENCOUNTER — CLINICAL DOCUMENTATION (OUTPATIENT)
Dept: ONCOLOGY | Age: 63
End: 2023-08-16

## 2023-08-16 NOTE — PROGRESS NOTES
[employer] FMLA completed, signed by physician and patient notified that paperwork is available for pickup at

## 2023-08-17 ENCOUNTER — HOSPITAL ENCOUNTER (OUTPATIENT)
Dept: NUCLEAR MEDICINE | Age: 63
Discharge: HOME | End: 2023-08-17
Attending: INTERNAL MEDICINE
Payer: COMMERCIAL

## 2023-08-17 DIAGNOSIS — C64.9 METASTATIC RENAL CELL CARCINOMA, UNSPECIFIED LATERALITY (HCC): ICD-10-CM

## 2023-08-17 DIAGNOSIS — C64.2 RENAL CELL CARCINOMA OF LEFT KIDNEY (HCC): ICD-10-CM

## 2023-08-17 DIAGNOSIS — C64.9 METASTATIC RENAL CELL CARCINOMA TO BONE (HCC): ICD-10-CM

## 2023-08-17 DIAGNOSIS — C79.51 METASTATIC RENAL CELL CARCINOMA TO BONE (HCC): ICD-10-CM

## 2023-08-17 PROCEDURE — 78306 BONE IMAGING WHOLE BODY: CPT | Performed by: INTERNAL MEDICINE

## 2023-08-17 PROCEDURE — 3430000000 HC RX DIAGNOSTIC RADIOPHARMACEUTICAL: Performed by: INTERNAL MEDICINE

## 2023-08-17 PROCEDURE — A9503 TC99M MEDRONATE: HCPCS | Performed by: INTERNAL MEDICINE

## 2023-08-17 RX ORDER — TC 99M MEDRONATE 20 MG/10ML
27 INJECTION, POWDER, LYOPHILIZED, FOR SOLUTION INTRAVENOUS
Status: COMPLETED | OUTPATIENT
Start: 2023-08-17 | End: 2023-08-17

## 2023-08-17 RX ADMIN — TC 99M MEDRONATE 27 MILLICURIE: 20 INJECTION, POWDER, LYOPHILIZED, FOR SOLUTION INTRAVENOUS at 10:00

## 2023-08-18 ENCOUNTER — HOSPITAL ENCOUNTER (OUTPATIENT)
Dept: NON INVASIVE DIAGNOSTICS | Age: 63
Discharge: HOME | End: 2023-08-18
Payer: COMMERCIAL

## 2023-08-18 DIAGNOSIS — C64.2 RENAL CELL CARCINOMA OF LEFT KIDNEY (HCC): ICD-10-CM

## 2023-08-18 DIAGNOSIS — Z11.59 ENCOUNTER FOR HEPATITIS C SCREENING TEST FOR LOW RISK PATIENT: ICD-10-CM

## 2023-08-18 DIAGNOSIS — C64.9 PRIMARY MALIGNANT NEOPLASM OF KIDNEY WITH METASTASIS FROM KIDNEY TO OTHER SITE, UNSPECIFIED LATERALITY (HCC): ICD-10-CM

## 2023-08-18 LAB
EKG ATRIAL RATE: 65 BPM
EKG DIAGNOSIS: NORMAL
EKG P-R INTERVAL: 136 MS
EKG Q-T INTERVAL: 418 MS
EKG QRS DURATION: 92 MS
EKG QTC CALCULATION (BAZETT): 434 MS
EKG R AXIS: 39 DEGREES
EKG T AXIS: 46 DEGREES
EKG VENTRICULAR RATE: 65 BPM

## 2023-08-18 PROCEDURE — 93005 ELECTROCARDIOGRAM TRACING: CPT

## 2023-08-24 ENCOUNTER — CLINICAL DOCUMENTATION (OUTPATIENT)
Dept: ONCOLOGY | Age: 63
End: 2023-08-24

## 2023-08-24 ENCOUNTER — HOSPITAL ENCOUNTER (OUTPATIENT)
Dept: INFUSION THERAPY | Age: 63
Discharge: HOME | End: 2023-08-24
Payer: COMMERCIAL

## 2023-08-24 ENCOUNTER — OFFICE VISIT (OUTPATIENT)
Dept: ONCOLOGY | Age: 63
End: 2023-08-24
Payer: COMMERCIAL

## 2023-08-24 VITALS
HEIGHT: 73 IN | BODY MASS INDEX: 20.22 KG/M2 | HEART RATE: 70 BPM | DIASTOLIC BLOOD PRESSURE: 70 MMHG | SYSTOLIC BLOOD PRESSURE: 148 MMHG | OXYGEN SATURATION: 96 % | WEIGHT: 152.6 LBS | TEMPERATURE: 98.2 F

## 2023-08-24 DIAGNOSIS — R53.83 OTHER FATIGUE: ICD-10-CM

## 2023-08-24 DIAGNOSIS — Z11.59 SCREENING FOR VIRAL DISEASE: ICD-10-CM

## 2023-08-24 DIAGNOSIS — C64.2 RENAL CELL CARCINOMA OF LEFT KIDNEY (HCC): Primary | ICD-10-CM

## 2023-08-24 DIAGNOSIS — D64.9 ANEMIA, UNSPECIFIED TYPE: ICD-10-CM

## 2023-08-24 PROCEDURE — G8427 DOCREV CUR MEDS BY ELIG CLIN: HCPCS | Performed by: INTERNAL MEDICINE

## 2023-08-24 PROCEDURE — G8420 CALC BMI NORM PARAMETERS: HCPCS | Performed by: INTERNAL MEDICINE

## 2023-08-24 PROCEDURE — 3017F COLORECTAL CA SCREEN DOC REV: CPT | Performed by: INTERNAL MEDICINE

## 2023-08-24 PROCEDURE — 1036F TOBACCO NON-USER: CPT | Performed by: INTERNAL MEDICINE

## 2023-08-24 PROCEDURE — 99211 OFF/OP EST MAY X REQ PHY/QHP: CPT

## 2023-08-24 PROCEDURE — 99214 OFFICE O/P EST MOD 30 MIN: CPT | Performed by: INTERNAL MEDICINE

## 2023-08-24 RX ORDER — BACLOFEN 10 MG/1
10 TABLET ORAL 2 TIMES DAILY PRN
Qty: 60 TABLET | Refills: 0 | Status: SHIPPED | OUTPATIENT
Start: 2023-08-24

## 2023-08-24 RX ORDER — FAMOTIDINE 10 MG/ML
20 INJECTION, SOLUTION INTRAVENOUS
OUTPATIENT
Start: 2023-08-28

## 2023-08-24 RX ORDER — MEPERIDINE HYDROCHLORIDE 50 MG/ML
12.5 INJECTION INTRAMUSCULAR; INTRAVENOUS; SUBCUTANEOUS PRN
OUTPATIENT
Start: 2023-08-28

## 2023-08-24 RX ORDER — SODIUM CHLORIDE 0.9 % (FLUSH) 0.9 %
5-40 SYRINGE (ML) INJECTION PRN
OUTPATIENT
Start: 2023-08-28

## 2023-08-24 RX ORDER — EPINEPHRINE 1 MG/ML
0.3 INJECTION, SOLUTION, CONCENTRATE INTRAVENOUS PRN
OUTPATIENT
Start: 2023-08-28

## 2023-08-24 RX ORDER — SODIUM CHLORIDE 9 MG/ML
5-250 INJECTION, SOLUTION INTRAVENOUS PRN
OUTPATIENT
Start: 2023-08-28

## 2023-08-24 RX ORDER — SODIUM CHLORIDE 9 MG/ML
INJECTION, SOLUTION INTRAVENOUS CONTINUOUS
OUTPATIENT
Start: 2023-08-28

## 2023-08-24 RX ORDER — ALBUTEROL SULFATE 90 UG/1
4 AEROSOL, METERED RESPIRATORY (INHALATION) PRN
OUTPATIENT
Start: 2023-08-28

## 2023-08-24 RX ORDER — HEPARIN SODIUM (PORCINE) LOCK FLUSH IV SOLN 100 UNIT/ML 100 UNIT/ML
500 SOLUTION INTRAVENOUS PRN
OUTPATIENT
Start: 2023-08-28

## 2023-08-24 RX ORDER — DIPHENHYDRAMINE HYDROCHLORIDE 50 MG/ML
50 INJECTION INTRAMUSCULAR; INTRAVENOUS
OUTPATIENT
Start: 2023-08-28

## 2023-08-24 RX ORDER — ACETAMINOPHEN 325 MG/1
650 TABLET ORAL
OUTPATIENT
Start: 2023-08-28

## 2023-08-24 RX ORDER — ONDANSETRON 2 MG/ML
8 INJECTION INTRAMUSCULAR; INTRAVENOUS
OUTPATIENT
Start: 2023-08-28

## 2023-08-24 NOTE — PROGRESS NOTES
[place] [id number]  PD Inhibitor (nivolumab) and Ipilimumab Followed by Nivolumab vs. VEGF TKI Cabozantinib with Nivolumab: A Phase III Trial in Metastatic Untreated Renal Cell Cancer    Patient Initials & ID #: S,P #[id number]  Eligibility Confirmed: Friday 08/18/23  Study Registration: Monday 08/21/23  Study Drug Request: Monday 08/21/23  Study Drug Receipt: Wednesday 08/23/23  Nivolumab DARF: 08/23/23 10 vials received from [facility]   (BMS [id number])   Ipilimumab DARF: 08/23/23   6 vials received from [facility]   ([address] [place], [place] [id number])    Patient enrolled to study [id number] Step 1 w/ plan to receive induction chemotherapy regimen containing Nivolumab 3mg/kg IV on D1 (+) Ipilimumab 1mg/kg D1 for up to four 21-day cycles      SEE SCHEMA BELOW        08/23/23 this nurse attempted to contact pt @ provided # [phone] to confirm 08/24/23 appointment, inform him study drug had arrived & schedule for C1D1, no answer, LVM requesting return call    08/24/23 pt had OV scheduled today @ 2:15pm with physician, infusion suite able to start treatment today as early as 11:30am, this nurse attempted to contact patient per provided number, no answer, detailed VM left w/ request for return call, direct contact # provided, attempted pt alternate number [phone] which was a work number and pt was unable to be reached.

## 2023-08-24 NOTE — PROGRESS NOTES
MA Rooming Questions  Patient: Amada Umana  MRN: [id number]    Date: 8/24/2023        1. Do you have any new issues?   no         2. Do you need any refills on medications?    no    3. Have you had any imaging done since your last visit? yes - Bone scan 08/17/23    4. Have you been hospitalized or seen in the emergency room since your last visit here?   no    5. Did the patient have a depression screening completed today?  No    No data recorded     PHQ-9 Given to (if applicable):               PHQ-9 Score (if applicable):                     [] Positive     []  Negative              Does question #9 need addressed (if applicable)                     [] Yes    []  No               Mayela Burrows MA
Patient Name:  Jude Chester  Patient :  1960  Patient MRN:  [id number]     Primary Oncologist: Stefano Kehr, MD  Referring Provider: Prashant Rondon MD     Date of Service: 2023      Reason for Consult:  recurrent clear cell ca     Chief Complaint:  No chief complaint on file. Patient Active Problem List:     Cigarette smoker     Left lower lobe pulmonary nodule     SOB (shortness of breath)    HPI:   Jude Chester is a pleasant 58 yo male patient who was referred for evaluation recurrent clear cell ca. 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 He has been followed by Dr David Hummel locally. 2023 CXR: Moderate left pleural effusion. No pneumothorax. Atelectasis or infiltrate in the left lung base. Underlying mass cannot be entirely excluded. Follow up to resolution is suggested. 2023 Left Pleural Fluid cytology with cell block:   -  Negative for malignancy. -  Benign mesothelial cells, mixed inflammation, and macrophages. Due to left chest pain he has work up. D-dimer was elevated, hence he had CTA chest on 2023 IR lung biopsy:  Final Pathologic Diagnosis:   A. Epicardial fat mass; ultrasound guided needle biopsy:   -     METASTATIC RENAL CELL CARCINOMA. B.   Epicardial fat mass; ultrasound guided aspirate for cell block:   -     Mostly blood and rare tumor cells  Based on         Past Medical History:   Diagnosis Date    History of kidney cancer     left     Past Surgical History:   Procedure Laterality Date    BACK SURGERY  2020    two disks replaced    KIDNEY REMOVAL Left     kidney cancer     Social History     Tobacco Use    Smoking status: Former     Packs/day: 0.50     Years: 50.00     Pack years: 25.00     Types: Cigarettes     Start date: 80     Quit date: 2023     Years since quittin.2    Smokeless tobacco: Never   Vaping
MG 1.7 07/19/2011     Immunology:  Lab Results   Component Value Date    PROT 6.5 08/10/2023     Coagulation Panel:  Lab Results   Component Value Date    PROTIME 14.8 (H) 07/26/2023    INR 1.1 07/26/2023    APTT 33.8 07/26/2023     Observations:  No data recorded     Follow-up for Relapsed or Stage IV and Surgically Unresectable Disease   H&P every 6-16 weeks for patients receiving systemic therapy, or more frequently as clinically indicated and adjusted for type of systemic  therapy patient is receiving   Laboratory evaluation as per requirements for therapeutic agent being used   Chest, abdominal, and pelvic imaging:  CT or MRI imaging to assess baseline pretreatment or prior to observation  Follow-up imaging every 6-16 weeks as per physician discretion, patient clinical status, and therapeutic schedule. Imaging interval to be  adjusted shorter or longer according to rate of disease change and sites of active disease   Consider MRI (preferred) or CT of head at baseline and as clinically indicated. Annual surveillance scans at physician discretion   MRI of spine as clinically indicated   Bone scan as clinically indicated     Assessment & Plan:  1. He was referred for evaluation recurrent clear cell ca with involvement of the left lung and ? Malignant pleural effusion. 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 CTA chest on 7/14/2023 at [facility] reviewed as above. 7/26/2023 IR lung biopsy: METASTATIC RENAL CELL CARCINOMA. Fluid cytology from left pleural effusion negative for malignancy. Clinically he is not candidate for metastasectomy. He has intermediate prognostic factors. I will look for clinical trial and order CARIS study. We discuss about preferred choice of treatment based on NCCN.  8/9/2023 CT AP: no evidence of recurrent tumor within the abdomen. 8/10/2023 creat 1.4. LFTs wnl. TSH wnl.  WBC 5.5, Hg 9.7, plat
PAST MEDICAL HISTORY:  GERD (gastroesophageal reflux disease)     Hypertension     Lupus

## 2023-08-24 NOTE — PROGRESS NOTES
Pt presented to clinic for OV prior to initiation of protocol treatment, pt did not receive prior messages as he arrived directly from work, informed pt we are able to begin his treatment regimen tomorrow 08/25/23, pt would like to defer Tx initiation until Monday 08/28/23, discussed w/ scheduling and infusion suite, pt scheduled for Monday 08/28/23 @ 1:30, labs to be drawn prior to Tx initiation

## 2023-08-25 DIAGNOSIS — C64.2 RENAL CELL CARCINOMA OF LEFT KIDNEY (HCC): Primary | ICD-10-CM

## 2023-08-27 NOTE — PROGRESS NOTES
Patient Name:  Gala Minor  Patient :  1960  Patient MRN:  [id number]     Primary Oncologist: Naomi Wayne MD  Referring Provider: Alvino Anaya MD     Date of Service: 2023     Chief Complaint:    Chief Complaint   Patient presents with    Follow-up     FU visit. Patient Active Problem List:     Cigarette smoker     Left lower lobe pulmonary nodule     SOB (shortness of breath)    HPI:   Gala Minor is a pleasant 56 yo male patient who was referred for evaluation recurrent clear cell ca. 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 He has been followed by Dr Bibi Lomax locally. 2023 CXR: Moderate left pleural effusion. No pneumothorax. Atelectasis or infiltrate in the left lung base. Underlying mass cannot be entirely excluded. Follow up to resolution is suggested. 2023 Left Pleural Fluid cytology with cell block:   -  Negative for malignancy. -  Benign mesothelial cells, mixed inflammation, and macrophages. Due to left chest pain he has work up. D-dimer was elevated, hence he had CTA chest on 2023 IR lung biopsy:  Final Pathologic Diagnosis:   A. Epicardial fat mass; ultrasound guided needle biopsy:   -     METASTATIC RENAL CELL CARCINOMA. B. Epicardial fat mass; ultrasound guided aspirate for cell block:   -     Mostly blood and rare tumor cells  Fluid cytology from left pleural effusion in progress. Clinically he is not candidate for metastasectomy. He has intermediate prognostic factors. He has Hg  10.4. PS > 80%. WBC, plat, LD and calcium wnl. Recurrrent after >1 year. I will look for clinical trial and order CARIS study. We discuss about preferred choice of treatment based on NCCN. I offer axitinib and pembroluzimab. Potential SE discuss with him. He has one daughter. Spouse . No family history of cancer.  Colonoscopy was

## 2023-08-28 ENCOUNTER — HOSPITAL ENCOUNTER (OUTPATIENT)
Dept: INFUSION THERAPY | Age: 63
Discharge: HOME | End: 2023-08-28
Payer: COMMERCIAL

## 2023-08-28 VITALS
HEIGHT: 73 IN | TEMPERATURE: 98.6 F | WEIGHT: 152 LBS | OXYGEN SATURATION: 96 % | SYSTOLIC BLOOD PRESSURE: 121 MMHG | DIASTOLIC BLOOD PRESSURE: 73 MMHG | RESPIRATION RATE: 16 BRPM | HEART RATE: 73 BPM | BODY MASS INDEX: 20.15 KG/M2

## 2023-08-28 DIAGNOSIS — R53.83 OTHER FATIGUE: Primary | ICD-10-CM

## 2023-08-28 DIAGNOSIS — C64.2 RENAL CELL CARCINOMA OF LEFT KIDNEY (HCC): Primary | ICD-10-CM

## 2023-08-28 DIAGNOSIS — C64.2 RENAL CELL CARCINOMA OF LEFT KIDNEY (HCC): ICD-10-CM

## 2023-08-28 DIAGNOSIS — Z11.59 SCREENING FOR VIRAL DISEASE: ICD-10-CM

## 2023-08-28 DIAGNOSIS — C64.9 PRIMARY MALIGNANT NEOPLASM OF KIDNEY WITH METASTASIS FROM KIDNEY TO OTHER SITE, UNSPECIFIED LATERALITY (HCC): ICD-10-CM

## 2023-08-28 DIAGNOSIS — D64.9 ANEMIA, UNSPECIFIED TYPE: ICD-10-CM

## 2023-08-28 LAB
ALBUMIN SERPL-MCNC: 4.2 GM/DL (ref 3.4–5)
ALP BLD-CCNC: 107 IU/L (ref 40–129)
ALT SERPL-CCNC: 20 U/L (ref 10–40)
ANION GAP SERPL CALCULATED.3IONS-SCNC: 13 MMOL/L (ref 4–16)
AST SERPL-CCNC: 18 IU/L (ref 15–37)
BASOPHILS ABSOLUTE: 0.1 K/CU MM
BASOPHILS RELATIVE PERCENT: 1.2 % (ref 0–1)
BILIRUB SERPL-MCNC: 0.2 MG/DL (ref 0–1)
BUN SERPL-MCNC: 24 MG/DL (ref 6–23)
CALCIUM SERPL-MCNC: 9.6 MG/DL (ref 8.3–10.6)
CHLORIDE BLD-SCNC: 104 MMOL/L (ref 99–110)
CO2: 24 MMOL/L (ref 21–32)
CREAT SERPL-MCNC: 1.5 MG/DL (ref 0.9–1.3)
DIFFERENTIAL TYPE: ABNORMAL
EOSINOPHILS ABSOLUTE: 0.1 K/CU MM
EOSINOPHILS RELATIVE PERCENT: 2.4 % (ref 0–3)
FERRITIN: 385 NG/ML (ref 30–400)
FOLATE SERPL-MCNC: 9.4 NG/ML (ref 3.1–17.5)
GFR SERPL CREATININE-BSD FRML MDRD: 52 ML/MIN/1.73M2
GLUCOSE SERPL-MCNC: 99 MG/DL (ref 70–99)
HCT VFR BLD CALC: 33.2 % (ref 42–52)
HEMOGLOBIN: 10.6 GM/DL (ref 13.5–18)
IRON: 36 UG/DL (ref 59–158)
LYMPHOCYTES ABSOLUTE: 1.6 K/CU MM
LYMPHOCYTES RELATIVE PERCENT: 26.4 % (ref 24–44)
MCH RBC QN AUTO: 29.7 PG (ref 27–31)
MCHC RBC AUTO-ENTMCNC: 31.9 % (ref 32–36)
MCV RBC AUTO: 93 FL (ref 78–100)
MONOCYTES ABSOLUTE: 0.6 K/CU MM
MONOCYTES RELATIVE PERCENT: 9.5 % (ref 0–4)
PCT TRANSFERRIN: 15 % (ref 10–44)
PDW BLD-RTO: 13.3 % (ref 11.7–14.9)
PLATELET # BLD: 273 K/CU MM (ref 140–440)
PMV BLD AUTO: 9.1 FL (ref 7.5–11.1)
POTASSIUM SERPL-SCNC: 4.7 MMOL/L (ref 3.5–5.1)
RBC # BLD: 3.57 M/CU MM (ref 4.6–6.2)
SEGMENTED NEUTROPHILS ABSOLUTE COUNT: 3.6 K/CU MM
SEGMENTED NEUTROPHILS RELATIVE PERCENT: 60.5 % (ref 36–66)
SODIUM BLD-SCNC: 141 MMOL/L (ref 135–145)
TOTAL IRON BINDING CAPACITY: 240 UG/DL (ref 250–450)
TOTAL PROTEIN: 6.9 GM/DL (ref 6.4–8.2)
UNSATURATED IRON BINDING CAPACITY: 204 UG/DL (ref 110–370)
VITAMIN B-12: 346.3 PG/ML (ref 211–911)
WBC # BLD: 5.9 K/CU MM (ref 4–10.5)

## 2023-08-28 PROCEDURE — 96413 CHEMO IV INFUSION 1 HR: CPT

## 2023-08-28 PROCEDURE — 84443 ASSAY THYROID STIM HORMONE: CPT

## 2023-08-28 PROCEDURE — 83540 ASSAY OF IRON: CPT

## 2023-08-28 PROCEDURE — 96417 CHEMO IV INFUS EACH ADDL SEQ: CPT

## 2023-08-28 PROCEDURE — 82607 VITAMIN B-12: CPT

## 2023-08-28 PROCEDURE — 83550 IRON BINDING TEST: CPT

## 2023-08-28 PROCEDURE — 82746 ASSAY OF FOLIC ACID SERUM: CPT

## 2023-08-28 PROCEDURE — 36415 COLL VENOUS BLD VENIPUNCTURE: CPT

## 2023-08-28 PROCEDURE — 85025 COMPLETE CBC W/AUTO DIFF WBC: CPT

## 2023-08-28 PROCEDURE — 82728 ASSAY OF FERRITIN: CPT

## 2023-08-28 PROCEDURE — 2580000003 HC RX 258: Performed by: INTERNAL MEDICINE

## 2023-08-28 PROCEDURE — 80053 COMPREHEN METABOLIC PANEL: CPT

## 2023-08-28 RX ORDER — LOPERAMIDE HYDROCHLORIDE 2 MG/1
TABLET ORAL
Qty: 90 TABLET | Refills: 0 | Status: SHIPPED | OUTPATIENT
Start: 2023-08-28

## 2023-08-28 RX ORDER — ONDANSETRON 4 MG/1
4 TABLET, ORALLY DISINTEGRATING ORAL EVERY 8 HOURS PRN
Qty: 90 TABLET | Refills: 1 | Status: SHIPPED | OUTPATIENT
Start: 2023-08-28

## 2023-08-28 RX ORDER — SODIUM CHLORIDE 9 MG/ML
5-250 INJECTION, SOLUTION INTRAVENOUS PRN
Status: DISCONTINUED | OUTPATIENT
Start: 2023-08-28 | End: 2023-08-29 | Stop reason: HOSPADM

## 2023-08-28 RX ORDER — BACLOFEN 10 MG/1
10 TABLET ORAL 2 TIMES DAILY PRN
Qty: 60 TABLET | Refills: 0 | Status: SHIPPED | OUTPATIENT
Start: 2023-08-28

## 2023-08-28 RX ADMIN — SODIUM CHLORIDE 20 ML/HR: 9 INJECTION, SOLUTION INTRAVENOUS at 14:32

## 2023-08-28 NOTE — PROGRESS NOTES
Pt here for new tx. PIV placed with blood return noted. Education done by [employer] pt is in a clinical trial. Hepatitis panel drawn and reviewed from 8/10. All labs drawn today. CBC reviewed and tx released. Pt has no concerns or issues to discuss with physician at this time. Pt instructed to inform us if he would develop any symptoms of SOB, dizziness, vision changes, diarrhea, increased fatigue, falls, numbness tingling to bilateral hands or feet, decrease in appetite. Pt instructed to drink plenty of water or Gatorade at home for hydration. Patient's status assessed and documented appropriately. All labs and required results were also reviewed today. Treatment parameters have been reviewed. Today's treatment has been approved by the provider. Treatment orders and medication sequencing (when applicable) was verified by 2 registered nurses. The treatment plan was confirmed with the patient prior to administration, and the patient understands the need to report any treatment-related symptoms. Prior to administration, when applicable, the following 8 elements of medication administration were reviewed with 2nd Registered Nurse prior to dosing: drug name, drug dose, infusion volume when prepared in a syringe, rate of administration, expiration dates and/or times, appearance and integrity of drug(s), and rate of pump for infusion. The 5 rights of medication administration have been verified.        Pt tolerated tx without incident left ambulatory discharge instructions given

## 2023-08-29 ENCOUNTER — CLINICAL DOCUMENTATION (OUTPATIENT)
Dept: INFUSION THERAPY | Age: 63
End: 2023-08-29

## 2023-08-29 DIAGNOSIS — C64.2 RENAL CELL CARCINOMA OF LEFT KIDNEY (HCC): Primary | ICD-10-CM

## 2023-08-29 DIAGNOSIS — Z11.59 SCREENING FOR VIRAL DISEASE: ICD-10-CM

## 2023-08-29 DIAGNOSIS — C64.2 RENAL CELL CARCINOMA OF LEFT KIDNEY (HCC): ICD-10-CM

## 2023-08-29 DIAGNOSIS — R53.83 OTHER FATIGUE: ICD-10-CM

## 2023-08-29 LAB — TSH SERPL DL<=0.005 MIU/L-ACNC: 2.56 UIU/ML (ref 0.27–4.2)

## 2023-08-29 NOTE — PROGRESS NOTES
This nurse contacted pt at provided number [phone] for follow-up s/p 08/28/23 C1 Nivolumab (+) Ipilimumab per [id number], pt states he is doing well, he worked a full day today, states he is a more tired than usual, plans to get plenty of rest this evening, states he had some diarrhea in the evening s/p Tx and a loose stool this morning - resolved with Peptobismol, pt denied additional physical issues, states he was only able to pick-up the Zofran script yesterday, confirmed Loperamide & Baclofen both sent to pharmacy 08/28/23 - encouraged to follow up with pharmacy, reinforced monitoring of possible AEs, pt stated understanding, encouraged to contact office with any changes/questions/concerns, direct contact information has been provided.

## 2023-09-05 ENCOUNTER — HOSPITAL ENCOUNTER (OUTPATIENT)
Dept: INFUSION THERAPY | Age: 63
Discharge: HOME | End: 2023-09-05
Payer: COMMERCIAL

## 2023-09-05 ENCOUNTER — OFFICE VISIT (OUTPATIENT)
Dept: ONCOLOGY | Age: 63
End: 2023-09-05

## 2023-09-05 VITALS
HEIGHT: 73 IN | RESPIRATION RATE: 18 BRPM | TEMPERATURE: 97.8 F | BODY MASS INDEX: 19.93 KG/M2 | SYSTOLIC BLOOD PRESSURE: 129 MMHG | HEART RATE: 90 BPM | OXYGEN SATURATION: 94 % | DIASTOLIC BLOOD PRESSURE: 70 MMHG | WEIGHT: 150.4 LBS

## 2023-09-05 DIAGNOSIS — C64.2 RENAL CELL CARCINOMA OF LEFT KIDNEY (HCC): Primary | ICD-10-CM

## 2023-09-05 DIAGNOSIS — R53.83 OTHER FATIGUE: ICD-10-CM

## 2023-09-05 DIAGNOSIS — Z11.59 SCREENING FOR VIRAL DISEASE: ICD-10-CM

## 2023-09-05 PROCEDURE — 99211 OFF/OP EST MAY X REQ PHY/QHP: CPT

## 2023-09-05 RX ORDER — MEPERIDINE HYDROCHLORIDE 50 MG/ML
12.5 INJECTION INTRAMUSCULAR; INTRAVENOUS; SUBCUTANEOUS PRN
OUTPATIENT
Start: 2023-09-18

## 2023-09-05 RX ORDER — SODIUM CHLORIDE 9 MG/ML
5-250 INJECTION, SOLUTION INTRAVENOUS PRN
OUTPATIENT
Start: 2023-09-18

## 2023-09-05 RX ORDER — FAMOTIDINE 10 MG/ML
20 INJECTION, SOLUTION INTRAVENOUS
OUTPATIENT
Start: 2023-09-18

## 2023-09-05 RX ORDER — SODIUM CHLORIDE 9 MG/ML
INJECTION, SOLUTION INTRAVENOUS CONTINUOUS
OUTPATIENT
Start: 2023-09-18

## 2023-09-05 RX ORDER — DIPHENHYDRAMINE HYDROCHLORIDE 50 MG/ML
50 INJECTION INTRAMUSCULAR; INTRAVENOUS
OUTPATIENT
Start: 2023-09-18

## 2023-09-05 RX ORDER — ACETAMINOPHEN 325 MG/1
650 TABLET ORAL
OUTPATIENT
Start: 2023-09-18

## 2023-09-05 RX ORDER — ONDANSETRON 2 MG/ML
8 INJECTION INTRAMUSCULAR; INTRAVENOUS
OUTPATIENT
Start: 2023-09-18

## 2023-09-05 RX ORDER — SODIUM CHLORIDE 0.9 % (FLUSH) 0.9 %
5-40 SYRINGE (ML) INJECTION PRN
OUTPATIENT
Start: 2023-09-18

## 2023-09-05 RX ORDER — ALBUTEROL SULFATE 90 UG/1
4 AEROSOL, METERED RESPIRATORY (INHALATION) PRN
OUTPATIENT
Start: 2023-09-18

## 2023-09-05 RX ORDER — HEPARIN SODIUM (PORCINE) LOCK FLUSH IV SOLN 100 UNIT/ML 100 UNIT/ML
500 SOLUTION INTRAVENOUS PRN
OUTPATIENT
Start: 2023-09-18

## 2023-09-05 RX ORDER — EPINEPHRINE 1 MG/ML
0.3 INJECTION, SOLUTION, CONCENTRATE INTRAVENOUS PRN
OUTPATIENT
Start: 2023-09-18

## 2023-09-05 NOTE — PROGRESS NOTES
MA Rooming Questions  Patient: Courtney Ortega  MRN: [id number]    Date: 9/5/2023        1. Do you have any new issues?   no         2. Do you need any refills on medications?    no    3. Have you had any imaging done since your last visit?   no    4. Have you been hospitalized or seen in the emergency room since your last visit here?   no    5. Did the patient have a depression screening completed today?  No    No data recorded     PHQ-9 Given to (if applicable):               PHQ-9 Score (if applicable):                     [] Positive     []  Negative              Does question #9 need addressed (if applicable)                     [] Yes    []  No               Deb Caruso MA

## 2023-09-18 ENCOUNTER — HOSPITAL ENCOUNTER (OUTPATIENT)
Dept: INFUSION THERAPY | Age: 63
Discharge: HOME | End: 2023-09-18
Payer: COMMERCIAL

## 2023-09-18 ENCOUNTER — APPOINTMENT (OUTPATIENT)
Dept: INFUSION THERAPY | Age: 63
End: 2023-09-18
Payer: COMMERCIAL

## 2023-09-18 DIAGNOSIS — C64.2 RENAL CELL CARCINOMA OF LEFT KIDNEY (HCC): ICD-10-CM

## 2023-09-18 LAB
ALBUMIN SERPL-MCNC: 4.2 GM/DL (ref 3.4–5)
ALP BLD-CCNC: 109 IU/L (ref 40–128)
ALT SERPL-CCNC: 18 U/L (ref 10–40)
ANION GAP SERPL CALCULATED.3IONS-SCNC: 13 MMOL/L (ref 4–16)
AST SERPL-CCNC: 22 IU/L (ref 15–37)
BASOPHILS ABSOLUTE: 0.1 K/CU MM
BASOPHILS RELATIVE PERCENT: 1.3 % (ref 0–1)
BILIRUB SERPL-MCNC: 0.2 MG/DL (ref 0–1)
BUN SERPL-MCNC: 22 MG/DL (ref 6–23)
CALCIUM SERPL-MCNC: 9.5 MG/DL (ref 8.3–10.6)
CHLORIDE BLD-SCNC: 103 MMOL/L (ref 99–110)
CO2: 23 MMOL/L (ref 21–32)
CREAT SERPL-MCNC: 1.4 MG/DL (ref 0.9–1.3)
DIFFERENTIAL TYPE: ABNORMAL
EOSINOPHILS ABSOLUTE: 0.2 K/CU MM
EOSINOPHILS RELATIVE PERCENT: 4.1 % (ref 0–3)
GFR SERPL CREATININE-BSD FRML MDRD: 56 ML/MIN/1.73M2
GLUCOSE SERPL-MCNC: 100 MG/DL (ref 70–99)
HCT VFR BLD CALC: 32.4 % (ref 42–52)
HEMOGLOBIN: 10.3 GM/DL (ref 13.5–18)
LYMPHOCYTES ABSOLUTE: 1.6 K/CU MM
LYMPHOCYTES RELATIVE PERCENT: 29.3 % (ref 24–44)
MCH RBC QN AUTO: 29.6 PG (ref 27–31)
MCHC RBC AUTO-ENTMCNC: 31.8 % (ref 32–36)
MCV RBC AUTO: 93.1 FL (ref 78–100)
MONOCYTES ABSOLUTE: 0.5 K/CU MM
MONOCYTES RELATIVE PERCENT: 9.5 % (ref 0–4)
PDW BLD-RTO: 13.7 % (ref 11.7–14.9)
PLATELET # BLD: 283 K/CU MM (ref 140–440)
PMV BLD AUTO: 8.9 FL (ref 7.5–11.1)
POTASSIUM SERPL-SCNC: 4.8 MMOL/L (ref 3.5–5.1)
RBC # BLD: 3.48 M/CU MM (ref 4.6–6.2)
SEGMENTED NEUTROPHILS ABSOLUTE COUNT: 3.1 K/CU MM
SEGMENTED NEUTROPHILS RELATIVE PERCENT: 55.8 % (ref 36–66)
SODIUM BLD-SCNC: 139 MMOL/L (ref 135–145)
TOTAL PROTEIN: 7.1 GM/DL (ref 6.4–8.2)
TSH SERPL DL<=0.005 MIU/L-ACNC: 2.73 UIU/ML (ref 0.27–4.2)
WBC # BLD: 5.6 K/CU MM (ref 4–10.5)

## 2023-09-18 PROCEDURE — 80053 COMPREHEN METABOLIC PANEL: CPT

## 2023-09-18 PROCEDURE — 85025 COMPLETE CBC W/AUTO DIFF WBC: CPT

## 2023-09-18 PROCEDURE — 84443 ASSAY THYROID STIM HORMONE: CPT

## 2023-09-18 PROCEDURE — 36415 COLL VENOUS BLD VENIPUNCTURE: CPT

## 2023-09-19 ENCOUNTER — HOSPITAL ENCOUNTER (OUTPATIENT)
Dept: INFUSION THERAPY | Age: 63
Discharge: HOME | End: 2023-09-19
Payer: COMMERCIAL

## 2023-09-19 VITALS
RESPIRATION RATE: 16 BRPM | OXYGEN SATURATION: 95 % | DIASTOLIC BLOOD PRESSURE: 59 MMHG | TEMPERATURE: 98.3 F | HEART RATE: 78 BPM | SYSTOLIC BLOOD PRESSURE: 118 MMHG | WEIGHT: 153.4 LBS | BODY MASS INDEX: 20.24 KG/M2

## 2023-09-19 DIAGNOSIS — Z11.59 SCREENING FOR VIRAL DISEASE: ICD-10-CM

## 2023-09-19 DIAGNOSIS — R53.83 OTHER FATIGUE: ICD-10-CM

## 2023-09-19 DIAGNOSIS — C64.2 RENAL CELL CARCINOMA OF LEFT KIDNEY (HCC): Primary | ICD-10-CM

## 2023-09-19 LAB
BILIRUBIN URINE: NEGATIVE MG/DL
BLOOD, URINE: NEGATIVE
CLARITY: CLEAR
COLOR: YELLOW
COMMENT UA: ABNORMAL
GLUCOSE, URINE: NEGATIVE MG/DL
KETONES, URINE: ABNORMAL MG/DL
LEUKOCYTE ESTERASE, URINE: NEGATIVE
NITRITE URINE, QUANTITATIVE: NEGATIVE
PH, URINE: 5.5 (ref 5–8)
PROTEIN UA: NEGATIVE MG/DL
SPECIFIC GRAVITY UA: >1.03 (ref 1–1.03)
UROBILINOGEN, URINE: 0.2 MG/DL (ref 0.2–1)

## 2023-09-19 PROCEDURE — 2580000003 HC RX 258: Performed by: INTERNAL MEDICINE

## 2023-09-19 PROCEDURE — 81003 URINALYSIS AUTO W/O SCOPE: CPT

## 2023-09-19 PROCEDURE — 96367 TX/PROPH/DG ADDL SEQ IV INF: CPT

## 2023-09-19 PROCEDURE — 96365 THER/PROPH/DIAG IV INF INIT: CPT

## 2023-09-19 RX ORDER — SODIUM CHLORIDE 0.9 % (FLUSH) 0.9 %
5-40 SYRINGE (ML) INJECTION PRN
Status: DISCONTINUED | OUTPATIENT
Start: 2023-09-19 | End: 2023-09-20 | Stop reason: HOSPADM

## 2023-09-19 RX ORDER — SODIUM CHLORIDE 9 MG/ML
5-250 INJECTION, SOLUTION INTRAVENOUS PRN
Status: DISCONTINUED | OUTPATIENT
Start: 2023-09-19 | End: 2023-09-20 | Stop reason: HOSPADM

## 2023-09-19 RX ADMIN — SODIUM CHLORIDE 20 ML/HR: 9 INJECTION, SOLUTION INTRAVENOUS at 15:08

## 2023-09-19 ASSESSMENT — PAIN SCALES - WONG BAKER: WONGBAKER_NUMERICALRESPONSE: 2

## 2023-09-19 ASSESSMENT — PAIN DESCRIPTION - LOCATION: LOCATION: OTHER (COMMENT)

## 2023-09-19 NOTE — PROGRESS NOTES
Pt arrived for [facility] and [facility] treatment. PIV placed without difficulty. Positive blood return noted. Urine collected for urinalysis. Pt is in clinical trial, urine to be collected at every treatment. Labs reviewed from 9/18/23, within treatment parameters. Treatment plan approved, released and completed as ordered. Pt tolerated well. PIV removed per protocol. AVS provided to patient. Discharge ambulatory in stable condition.   Yves Salas RN

## 2023-09-20 ENCOUNTER — CLINICAL DOCUMENTATION (OUTPATIENT)
Dept: INFUSION THERAPY | Age: 63
End: 2023-09-20

## 2023-09-20 DIAGNOSIS — D64.9 ANEMIA, UNSPECIFIED TYPE: ICD-10-CM

## 2023-09-20 DIAGNOSIS — Z29.8 IMMUNOTHERAPY: ICD-10-CM

## 2023-09-20 DIAGNOSIS — C64.9 METASTATIC RENAL CELL CARCINOMA, UNSPECIFIED LATERALITY (HCC): Primary | ICD-10-CM

## 2023-09-27 ENCOUNTER — OFFICE VISIT (OUTPATIENT)
Dept: ONCOLOGY | Age: 63
End: 2023-09-27
Payer: COMMERCIAL

## 2023-09-27 ENCOUNTER — HOSPITAL ENCOUNTER (OUTPATIENT)
Dept: INFUSION THERAPY | Age: 63
Discharge: HOME | End: 2023-09-27
Payer: COMMERCIAL

## 2023-09-27 VITALS
OXYGEN SATURATION: 97 % | BODY MASS INDEX: 20.65 KG/M2 | HEIGHT: 73 IN | HEART RATE: 69 BPM | SYSTOLIC BLOOD PRESSURE: 117 MMHG | TEMPERATURE: 97.8 F | WEIGHT: 155.8 LBS | DIASTOLIC BLOOD PRESSURE: 60 MMHG

## 2023-09-27 DIAGNOSIS — C64.2 RENAL CELL CARCINOMA OF LEFT KIDNEY (HCC): Primary | ICD-10-CM

## 2023-09-27 PROCEDURE — 1036F TOBACCO NON-USER: CPT | Performed by: INTERNAL MEDICINE

## 2023-09-27 PROCEDURE — G8420 CALC BMI NORM PARAMETERS: HCPCS | Performed by: INTERNAL MEDICINE

## 2023-09-27 PROCEDURE — 99214 OFFICE O/P EST MOD 30 MIN: CPT | Performed by: INTERNAL MEDICINE

## 2023-09-27 PROCEDURE — 99211 OFF/OP EST MAY X REQ PHY/QHP: CPT

## 2023-09-27 PROCEDURE — 3017F COLORECTAL CA SCREEN DOC REV: CPT | Performed by: INTERNAL MEDICINE

## 2023-09-27 PROCEDURE — G8427 DOCREV CUR MEDS BY ELIG CLIN: HCPCS | Performed by: INTERNAL MEDICINE

## 2023-09-27 NOTE — PROGRESS NOTES
MA Rooming Questions  Patient: Alvino Zapata  MRN: [id number]    Date: 9/27/2023        1. Do you have any new issues?   no         2. Do you need any refills on medications?    no    3. Have you had any imaging done since your last visit?   no    4. Have you been hospitalized or seen in the emergency room since your last visit here?   no    5. Did the patient have a depression screening completed today?  No    No data recorded     PHQ-9 Given to (if applicable):               PHQ-9 Score (if applicable):                     [] Positive     []  Negative              Does question #9 need addressed (if applicable)                     [] Yes    []  No               Genny Santos CMA
of the left lung and ? Malignant pleural effusion. 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 CTA chest on 7/14/2023 at [facility] reviewed as above. 7/26/2023 IR lung biopsy: METASTATIC RENAL CELL CARCINOMA. Fluid cytology from left pleural effusion negative for malignancy. Clinically he is not candidate for metastasectomy. He has intermediate prognostic factors. I looked for clinical trial and order CARIS study. We discuss about preferred choice of treatment based on NCCN.  8/9/2023 CT AP: no evidence of recurrent tumor within the abdomen. 8/10/2023 creat 1.4. LFTs wnl. TSH wnl. WBC 5.5, Hg 9.7, plat 266, MCV 93.9. Hep B serology non reactive, not immune. 8/17/2023 Bone scan No evidence to suggest osseous metastatic disease. EKG NSR. He is on clinical trial and started Nivo and Ipli on 8/25/2023.   9/19/2023 C2 nivo and ipli  He has episode of diarrhea up to TID controlled with imodium. No skin rash. Mild fatigue. No grade III AE. Due for C3 on 10/10/2023. After 4 cycles he will have FU imaging study and will be randomized. I recommend to drink enough water and stay active. 2. He has chronic anemia. 8/1/2023 WBC 6.4, Hg 10.4, plat 240. LFTs wnl. . Creat 1.2.  08/28/23  Ferritin: 385 Iron: 36 (L) TIBC: 240 (L) Transferrin %: 15  FOLATE: 9.4 Vitamin B-12: 346.3  I will monitor CBC. RTC in 3 weeks or sooner. I have discussed the above stated plan with the patient and they verbalized understanding and agreed with the plan. Thank you for allowing us to participate in this patient's care.

## 2023-10-04 DIAGNOSIS — Z11.59 SCREENING FOR VIRAL DISEASE: ICD-10-CM

## 2023-10-04 DIAGNOSIS — R53.83 OTHER FATIGUE: ICD-10-CM

## 2023-10-04 DIAGNOSIS — C64.2 RENAL CELL CARCINOMA OF LEFT KIDNEY (HCC): Primary | ICD-10-CM

## 2023-10-09 ENCOUNTER — HOSPITAL ENCOUNTER (OUTPATIENT)
Dept: INFUSION THERAPY | Age: 63
Discharge: HOME | End: 2023-10-09
Payer: COMMERCIAL

## 2023-10-09 ENCOUNTER — APPOINTMENT (OUTPATIENT)
Dept: INFUSION THERAPY | Age: 63
End: 2023-10-09
Payer: COMMERCIAL

## 2023-10-09 DIAGNOSIS — Z11.59 SCREENING FOR VIRAL DISEASE: ICD-10-CM

## 2023-10-09 DIAGNOSIS — C64.2 RENAL CELL CARCINOMA OF LEFT KIDNEY (HCC): ICD-10-CM

## 2023-10-09 DIAGNOSIS — R53.83 OTHER FATIGUE: ICD-10-CM

## 2023-10-09 DIAGNOSIS — D64.9 ANEMIA, UNSPECIFIED TYPE: ICD-10-CM

## 2023-10-09 LAB
ALBUMIN SERPL-MCNC: 4 GM/DL (ref 3.4–5)
ALP BLD-CCNC: 97 IU/L (ref 40–129)
ALT SERPL-CCNC: 19 U/L (ref 10–40)
ANION GAP SERPL CALCULATED.3IONS-SCNC: 12 MMOL/L (ref 4–16)
AST SERPL-CCNC: 19 IU/L (ref 15–37)
BASOPHILS ABSOLUTE: 0.1 K/CU MM
BASOPHILS RELATIVE PERCENT: 1.3 % (ref 0–1)
BILIRUB SERPL-MCNC: 0.1 MG/DL (ref 0–1)
BUN SERPL-MCNC: 18 MG/DL (ref 6–23)
CALCIUM SERPL-MCNC: 9.4 MG/DL (ref 8.3–10.6)
CHLORIDE BLD-SCNC: 103 MMOL/L (ref 99–110)
CO2: 26 MMOL/L (ref 21–32)
CREAT SERPL-MCNC: 1.3 MG/DL (ref 0.9–1.3)
DIFFERENTIAL TYPE: ABNORMAL
EOSINOPHILS ABSOLUTE: 0.3 K/CU MM
EOSINOPHILS RELATIVE PERCENT: 4.5 % (ref 0–3)
FERRITIN: 338 NG/ML (ref 30–400)
FOLATE SERPL-MCNC: 12.5 NG/ML (ref 3.1–17.5)
GFR SERPL CREATININE-BSD FRML MDRD: >60 ML/MIN/1.73M2
GLUCOSE SERPL-MCNC: 90 MG/DL (ref 70–99)
HCT VFR BLD CALC: 31.4 % (ref 42–52)
HEMOGLOBIN: 9.8 GM/DL (ref 13.5–18)
IRON: 31 UG/DL (ref 59–158)
LYMPHOCYTES ABSOLUTE: 1.7 K/CU MM
LYMPHOCYTES RELATIVE PERCENT: 28.8 % (ref 24–44)
MCH RBC QN AUTO: 29.3 PG (ref 27–31)
MCHC RBC AUTO-ENTMCNC: 31.2 % (ref 32–36)
MCV RBC AUTO: 93.7 FL (ref 78–100)
MONOCYTES ABSOLUTE: 0.7 K/CU MM
MONOCYTES RELATIVE PERCENT: 10.9 % (ref 0–4)
PCT TRANSFERRIN: 12 % (ref 10–44)
PDW BLD-RTO: 14.7 % (ref 11.7–14.9)
PLATELET # BLD: 278 K/CU MM (ref 140–440)
PMV BLD AUTO: 8.6 FL (ref 7.5–11.1)
POTASSIUM SERPL-SCNC: 4.5 MMOL/L (ref 3.5–5.1)
RBC # BLD: 3.35 M/CU MM (ref 4.6–6.2)
SEGMENTED NEUTROPHILS ABSOLUTE COUNT: 3.2 K/CU MM
SEGMENTED NEUTROPHILS RELATIVE PERCENT: 54.5 % (ref 36–66)
SODIUM BLD-SCNC: 141 MMOL/L (ref 135–145)
TOTAL IRON BINDING CAPACITY: 255 UG/DL (ref 250–450)
TOTAL PROTEIN: 7.2 GM/DL (ref 6.4–8.2)
TSH SERPL DL<=0.005 MIU/L-ACNC: 2.6 UIU/ML (ref 0.27–4.2)
UNSATURATED IRON BINDING CAPACITY: 224 UG/DL (ref 110–370)
VITAMIN B-12: 344.7 PG/ML (ref 211–911)
WBC # BLD: 5.9 K/CU MM (ref 4–10.5)

## 2023-10-09 PROCEDURE — 84443 ASSAY THYROID STIM HORMONE: CPT

## 2023-10-09 PROCEDURE — 82728 ASSAY OF FERRITIN: CPT

## 2023-10-09 PROCEDURE — 36415 COLL VENOUS BLD VENIPUNCTURE: CPT

## 2023-10-09 PROCEDURE — 85025 COMPLETE CBC W/AUTO DIFF WBC: CPT

## 2023-10-09 PROCEDURE — 82746 ASSAY OF FOLIC ACID SERUM: CPT

## 2023-10-09 PROCEDURE — 83540 ASSAY OF IRON: CPT

## 2023-10-09 PROCEDURE — 80053 COMPREHEN METABOLIC PANEL: CPT

## 2023-10-09 PROCEDURE — 82607 VITAMIN B-12: CPT

## 2023-10-09 PROCEDURE — 83550 IRON BINDING TEST: CPT

## 2023-10-10 ENCOUNTER — HOSPITAL ENCOUNTER (OUTPATIENT)
Dept: INFUSION THERAPY | Age: 63
Discharge: HOME | End: 2023-10-10
Payer: COMMERCIAL

## 2023-10-10 VITALS
SYSTOLIC BLOOD PRESSURE: 130 MMHG | WEIGHT: 155.4 LBS | HEIGHT: 73 IN | HEART RATE: 68 BPM | OXYGEN SATURATION: 98 % | BODY MASS INDEX: 20.6 KG/M2 | TEMPERATURE: 98.4 F | DIASTOLIC BLOOD PRESSURE: 66 MMHG

## 2023-10-10 DIAGNOSIS — C64.2 RENAL CELL CARCINOMA OF LEFT KIDNEY (HCC): Primary | ICD-10-CM

## 2023-10-10 DIAGNOSIS — R53.83 OTHER FATIGUE: ICD-10-CM

## 2023-10-10 DIAGNOSIS — Z11.59 SCREENING FOR VIRAL DISEASE: ICD-10-CM

## 2023-10-10 LAB
BILIRUBIN URINE: NEGATIVE MG/DL
BLOOD, URINE: NEGATIVE
CLARITY: CLEAR
COLOR: YELLOW
COMMENT UA: NORMAL
EKG ATRIAL RATE: 70 BPM
EKG DIAGNOSIS: NORMAL
EKG P AXIS: 48 DEGREES
EKG P-R INTERVAL: 176 MS
EKG Q-T INTERVAL: 406 MS
EKG QRS DURATION: 84 MS
EKG QTC CALCULATION (BAZETT): 438 MS
EKG R AXIS: 47 DEGREES
EKG T AXIS: 56 DEGREES
EKG VENTRICULAR RATE: 70 BPM
GLUCOSE, URINE: NEGATIVE MG/DL
KETONES, URINE: NEGATIVE MG/DL
LEUKOCYTE ESTERASE, URINE: NEGATIVE
NITRITE URINE, QUANTITATIVE: NEGATIVE
PH, URINE: 6 (ref 5–8)
PROTEIN UA: NEGATIVE MG/DL
SPECIFIC GRAVITY UA: 1.02 (ref 1–1.03)
UROBILINOGEN, URINE: 0.2 MG/DL (ref 0.2–1)

## 2023-10-10 PROCEDURE — 93005 ELECTROCARDIOGRAM TRACING: CPT | Performed by: INTERNAL MEDICINE

## 2023-10-10 PROCEDURE — 96417 CHEMO IV INFUS EACH ADDL SEQ: CPT

## 2023-10-10 PROCEDURE — 2580000003 HC RX 258: Performed by: INTERNAL MEDICINE

## 2023-10-10 PROCEDURE — 96413 CHEMO IV INFUSION 1 HR: CPT

## 2023-10-10 PROCEDURE — 6360000002 HC RX W HCPCS: Performed by: INTERNAL MEDICINE

## 2023-10-10 PROCEDURE — 81003 URINALYSIS AUTO W/O SCOPE: CPT

## 2023-10-10 RX ORDER — SODIUM CHLORIDE 0.9 % (FLUSH) 0.9 %
5-40 SYRINGE (ML) INJECTION PRN
Status: CANCELLED | OUTPATIENT
Start: 2023-10-10

## 2023-10-10 RX ORDER — SODIUM CHLORIDE 9 MG/ML
5-250 INJECTION, SOLUTION INTRAVENOUS PRN
Status: DISCONTINUED | OUTPATIENT
Start: 2023-10-10 | End: 2023-10-11 | Stop reason: HOSPADM

## 2023-10-10 RX ORDER — ALBUTEROL SULFATE 90 UG/1
4 AEROSOL, METERED RESPIRATORY (INHALATION) PRN
OUTPATIENT
Start: 2023-10-10

## 2023-10-10 RX ORDER — SODIUM CHLORIDE 9 MG/ML
INJECTION, SOLUTION INTRAVENOUS CONTINUOUS
OUTPATIENT
Start: 2023-10-10

## 2023-10-10 RX ORDER — SODIUM CHLORIDE 9 MG/ML
5-250 INJECTION, SOLUTION INTRAVENOUS PRN
OUTPATIENT
Start: 2023-10-10

## 2023-10-10 RX ORDER — EPINEPHRINE 1 MG/ML
0.3 INJECTION, SOLUTION, CONCENTRATE INTRAVENOUS PRN
OUTPATIENT
Start: 2023-10-10

## 2023-10-10 RX ORDER — FAMOTIDINE 10 MG/ML
20 INJECTION, SOLUTION INTRAVENOUS
OUTPATIENT
Start: 2023-10-10

## 2023-10-10 RX ORDER — ONDANSETRON 2 MG/ML
8 INJECTION INTRAMUSCULAR; INTRAVENOUS
OUTPATIENT
Start: 2023-10-10

## 2023-10-10 RX ORDER — SODIUM CHLORIDE 0.9 % (FLUSH) 0.9 %
5-40 SYRINGE (ML) INJECTION PRN
Status: DISCONTINUED | OUTPATIENT
Start: 2023-10-10 | End: 2023-10-11 | Stop reason: HOSPADM

## 2023-10-10 RX ORDER — SODIUM CHLORIDE 9 MG/ML
5-250 INJECTION, SOLUTION INTRAVENOUS PRN
Status: CANCELLED | OUTPATIENT
Start: 2023-10-10

## 2023-10-10 RX ORDER — MEPERIDINE HYDROCHLORIDE 50 MG/ML
12.5 INJECTION INTRAMUSCULAR; INTRAVENOUS; SUBCUTANEOUS PRN
OUTPATIENT
Start: 2023-10-10

## 2023-10-10 RX ORDER — HEPARIN SODIUM (PORCINE) LOCK FLUSH IV SOLN 100 UNIT/ML 100 UNIT/ML
500 SOLUTION INTRAVENOUS PRN
OUTPATIENT
Start: 2023-10-10

## 2023-10-10 RX ORDER — DIPHENHYDRAMINE HYDROCHLORIDE 50 MG/ML
50 INJECTION INTRAMUSCULAR; INTRAVENOUS
OUTPATIENT
Start: 2023-10-10

## 2023-10-10 RX ORDER — ACETAMINOPHEN 325 MG/1
650 TABLET ORAL
OUTPATIENT
Start: 2023-10-10

## 2023-10-10 RX ADMIN — SODIUM CHLORIDE 210 MG: 9 INJECTION, SOLUTION INTRAVENOUS at 14:55

## 2023-10-10 RX ADMIN — SODIUM CHLORIDE 70 MG: 9 INJECTION, SOLUTION INTRAVENOUS at 15:35

## 2023-10-10 RX ADMIN — SODIUM CHLORIDE 20 ML/HR: 9 INJECTION, SOLUTION INTRAVENOUS at 14:54

## 2023-10-10 NOTE — PROGRESS NOTES
Patient arrived to treatment suite for chemotherapy infusions. PIV started in left hand and good blood return noted. Patient has no questions or concerns for the doctor at this time. Treatment approved and given. Patient tolerated well. Left treatment suite ambulatory. Discharge instructions provided. Patient's status assessed and documented appropriately. All labs and required results were also reviewed today. Treatment parameters have been reviewed. Today's treatment has been approved by the provider. Treatment orders and medication sequencing (when applicable) was verified by 2 registered nurses. The treatment plan was confirmed with the patient prior to administration, and the patient understands the need to report any treatment-related symptoms. Prior to administration, when applicable, the following 8 elements of medication administration were reviewed with 2nd Registered Nurse prior to dosing: drug name, drug dose, infusion volume when prepared in a syringe, rate of administration, expiration dates and/or times, appearance and integrity of drug(s), and rate of pump for infusion. The 5 rights of medication administration have been verified.

## 2023-10-11 ENCOUNTER — CLINICAL DOCUMENTATION (OUTPATIENT)
Dept: INFUSION THERAPY | Age: 63
End: 2023-10-11

## 2023-10-11 DIAGNOSIS — D64.9 ANEMIA, UNSPECIFIED TYPE: ICD-10-CM

## 2023-10-11 DIAGNOSIS — Z29.8 IMMUNOTHERAPY: ICD-10-CM

## 2023-10-11 DIAGNOSIS — C64.9 METASTATIC RENAL CELL CARCINOMA, UNSPECIFIED LATERALITY (HCC): Primary | ICD-10-CM

## 2023-10-11 DIAGNOSIS — C64.9 PRIMARY MALIGNANT NEOPLASM OF KIDNEY WITH METASTASIS FROM KIDNEY TO OTHER SITE, UNSPECIFIED LATERALITY (HCC): ICD-10-CM

## 2023-10-18 ENCOUNTER — CLINICAL DOCUMENTATION (OUTPATIENT)
Dept: INFUSION THERAPY | Age: 63
End: 2023-10-18

## 2023-10-18 ENCOUNTER — OFFICE VISIT (OUTPATIENT)
Dept: ONCOLOGY | Age: 63
End: 2023-10-18

## 2023-10-18 ENCOUNTER — HOSPITAL ENCOUNTER (OUTPATIENT)
Dept: INFUSION THERAPY | Age: 63
Discharge: HOME | End: 2023-10-18
Payer: COMMERCIAL

## 2023-10-18 VITALS
HEIGHT: 73 IN | DIASTOLIC BLOOD PRESSURE: 68 MMHG | WEIGHT: 154 LBS | RESPIRATION RATE: 16 BRPM | SYSTOLIC BLOOD PRESSURE: 127 MMHG | BODY MASS INDEX: 20.41 KG/M2 | HEART RATE: 70 BPM | OXYGEN SATURATION: 97 % | TEMPERATURE: 98.1 F

## 2023-10-18 DIAGNOSIS — C64.9 PRIMARY MALIGNANT NEOPLASM OF KIDNEY WITH METASTASIS FROM KIDNEY TO OTHER SITE, UNSPECIFIED LATERALITY (HCC): Primary | ICD-10-CM

## 2023-10-18 DIAGNOSIS — R91.1 LEFT LOWER LOBE PULMONARY NODULE: ICD-10-CM

## 2023-10-18 DIAGNOSIS — C64.2 RENAL CELL CARCINOMA OF LEFT KIDNEY (HCC): Primary | ICD-10-CM

## 2023-10-18 DIAGNOSIS — Z11.59 SCREENING FOR VIRAL DISEASE: ICD-10-CM

## 2023-10-18 DIAGNOSIS — R53.83 OTHER FATIGUE: ICD-10-CM

## 2023-10-18 PROCEDURE — 99211 OFF/OP EST MAY X REQ PHY/QHP: CPT

## 2023-10-18 RX ORDER — ALBUTEROL SULFATE 90 UG/1
4 AEROSOL, METERED RESPIRATORY (INHALATION) PRN
OUTPATIENT
Start: 2023-10-31

## 2023-10-18 RX ORDER — HEPARIN SODIUM (PORCINE) LOCK FLUSH IV SOLN 100 UNIT/ML 100 UNIT/ML
500 SOLUTION INTRAVENOUS PRN
OUTPATIENT
Start: 2023-10-31

## 2023-10-18 RX ORDER — ACETAMINOPHEN 325 MG/1
650 TABLET ORAL
OUTPATIENT
Start: 2023-10-31

## 2023-10-18 RX ORDER — SODIUM CHLORIDE 9 MG/ML
5-250 INJECTION, SOLUTION INTRAVENOUS PRN
OUTPATIENT
Start: 2023-10-31

## 2023-10-18 RX ORDER — SODIUM CHLORIDE 9 MG/ML
INJECTION, SOLUTION INTRAVENOUS CONTINUOUS
OUTPATIENT
Start: 2023-10-31

## 2023-10-18 RX ORDER — EPINEPHRINE 1 MG/ML
0.3 INJECTION, SOLUTION, CONCENTRATE INTRAVENOUS PRN
OUTPATIENT
Start: 2023-10-31

## 2023-10-18 RX ORDER — MEPERIDINE HYDROCHLORIDE 50 MG/ML
12.5 INJECTION INTRAMUSCULAR; INTRAVENOUS; SUBCUTANEOUS PRN
OUTPATIENT
Start: 2023-10-31

## 2023-10-18 RX ORDER — FAMOTIDINE 10 MG/ML
20 INJECTION, SOLUTION INTRAVENOUS
OUTPATIENT
Start: 2023-10-31

## 2023-10-18 RX ORDER — DIPHENHYDRAMINE HYDROCHLORIDE 50 MG/ML
50 INJECTION INTRAMUSCULAR; INTRAVENOUS
OUTPATIENT
Start: 2023-10-31

## 2023-10-18 RX ORDER — SODIUM CHLORIDE 0.9 % (FLUSH) 0.9 %
5-40 SYRINGE (ML) INJECTION PRN
OUTPATIENT
Start: 2023-10-31

## 2023-10-18 RX ORDER — ONDANSETRON 2 MG/ML
8 INJECTION INTRAMUSCULAR; INTRAVENOUS
OUTPATIENT
Start: 2023-10-31

## 2023-10-18 NOTE — PROGRESS NOTES
MA Rooming Questions  Patient: Angelica Lopez  MRN: [id number]    Date: 10/18/2023        1. Do you have any new issues? yes - has some concerns, recently seen PCP- said something about CXR? 2. Do you need any refills on medications?    no    3. Have you had any imaging done since your last visit?   no    4. Have you been hospitalized or seen in the emergency room since your last visit here?   no    5. Did the patient have a depression screening completed today?  No    No data recorded     PHQ-9 Given to (if applicable):               PHQ-9 Score (if applicable):                     [] Positive     []  Negative              Does question #9 need addressed (if applicable)                     [] Yes    []  No               Efrain Gonsalez CMA
disease   Consider MRI (preferred) or CT of head at baseline and as clinically indicated. Annual surveillance scans at physician discretion   MRI of spine as clinically indicated   Bone scan as clinically indicated     Assessment & Plan:  1. He was referred for evaluation recurrent clear cell ca with involvement of the left lung and ? Malignant pleural effusion. 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 [facility] chest on 7/14/2023 at [facility] reviewed as above. 7/26/2023 IR lung biopsy: METASTATIC RENAL CELL CARCINOMA. Fluid cytology from left pleural effusion negative for malignancy. Clinically he is not candidate for metastasectomy. He has intermediate prognostic factors. I looked for clinical trial and order CARIS study. We discuss about preferred choice of treatment based on NCCN.  8/9/2023 CT AP: no evidence of recurrent tumor within the abdomen. 8/10/2023 creat 1.4. LFTs wnl. TSH wnl. WBC 5.5, Hg 9.7, plat 266, MCV 93.9. Hep B serology non reactive, not immune. 8/17/2023 Bone scan No evidence to suggest osseous metastatic disease. EKG NSR. He is on clinical trial and started Nivo and Ipli on 8/25/2023.   9/19/2023 C2 nivo and ipli  He has episode of diarrhea up to TID controlled with imodium. 10/9/2023 CMP wnl. B-12 345. Folate 12.5. TSH 2nl. WBC 5.9, Hg 9.8, MCV 93.7, plat 278. Ferritin 338, iron 31, TIBC 255, sat 12.  10/102/2023 ECG stable, NSR with premature atrial complexes in a pattern of bigeminy. 10/31/2023 due for C4. No grade III AE. He will have follow up imaging study in November 2023 and randomization according to result of imaging study. I advise to call for issue with treatment. Will order CXR to reassess to the left lung. He has decreased BS to left lung. 2. He has chronic anemia. 8/1/2023 WBC 6.4, Hg 10.4, plat 240. LFTs wnl. .   Creat 1.2.  08/28/23  Ferritin: 385 Iron: 36 (L) TIBC:

## 2023-10-19 ENCOUNTER — CLINICAL DOCUMENTATION (OUTPATIENT)
Dept: ONCOLOGY | Age: 63
End: 2023-10-19

## 2023-10-19 NOTE — PROGRESS NOTES
10/19/23 faxed office notes, face sheet and orders for CT CAP to [facility] for scheduling. I made note on the fax that the pt is in a clinical trial [id number] and must be scheduled for the CT CAP between the dates of 11/6/23 - 11/11/23. I will follow up to get the actual scheduled date for the pt. .-successful fax

## 2023-10-20 ENCOUNTER — CLINICAL DOCUMENTATION (OUTPATIENT)
Dept: INFUSION THERAPY | Age: 63
End: 2023-10-20

## 2023-10-20 ENCOUNTER — CLINICAL DOCUMENTATION (OUTPATIENT)
Dept: ONCOLOGY | Age: 63
End: 2023-10-20

## 2023-10-20 ENCOUNTER — TELEPHONE (OUTPATIENT)
Dept: ONCOLOGY | Age: 63
End: 2023-10-20

## 2023-10-20 DIAGNOSIS — C64.9 MALIGNANT NEOPLASM OF KIDNEY METASTATIC TO LUNG (HCC): Primary | ICD-10-CM

## 2023-10-20 DIAGNOSIS — C78.00 MALIGNANT NEOPLASM OF KIDNEY METASTATIC TO LUNG (HCC): Primary | ICD-10-CM

## 2023-10-20 DIAGNOSIS — J90 PLEURAL EFFUSION: ICD-10-CM

## 2023-10-20 DIAGNOSIS — Z29.8 IMMUNOTHERAPY: ICD-10-CM

## 2023-10-20 NOTE — PROGRESS NOTES
10/20/23 - faxed STAT CT CAP orders and RECIST baseline Measurements to [facility] for scheduling.  Successful fax

## 2023-10-20 NOTE — TELEPHONE ENCOUNTER
10/20/23 - left pt vm to call Boaz Houser at [phone] to get the stat CT scans scheduled. I asked the pt to call ASAP as they needed to be done Monday/Tues of next week.

## 2023-10-20 NOTE — PROGRESS NOTES
Results from 10/18/23 CXR received from [facility] as follows:  3.6 cm ANN mass, which may be new from previous CTA  Hazy opacity in the ANN which may represent loculated pleural fluid or pleural-based mass. Additional left-sided nodules new or increased from previous CTA  Loculated left pleural effusion    Study reviewed per physician, order for STAT CT CAP w & w/o contrast placed, to be completed @ [address] [place] [address] per patient request, attempted to contact pt with update, pt unavailable - detailed VM left with request for return call, direct contact information provided.

## 2023-10-21 NOTE — PROGRESS NOTES
Patient Name:  Effie Caputo  Patient :  1960  Patient MRN:  [id number]     Primary Oncologist: Jesi Alarcon MD  Referring Provider: Cinthia Walker MD     Date of Service: 2023     Chief Complaint:    Chief Complaint   Patient presents with    Follow-up     FU visit. Patient Active Problem List:     Cigarette smoker     Left lower lobe pulmonary nodule     SOB (shortness of breath)    HPI:   Effie Caputo is a pleasant 56 yo male patient who was referred for evaluation recurrent clear cell ca of the kidney. 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 He has been followed by Dr Willis Burns locally. 2023 CXR: Moderate left pleural effusion. No pneumothorax. Atelectasis or infiltrate in the left lung base. Underlying mass cannot be entirely excluded. Follow up to resolution is suggested. 2023 Left Pleural Fluid cytology with cell block:   -  Negative for malignancy. -  Benign mesothelial cells, mixed inflammation, and macrophages. Due to left chest pain he has work up. D-dimer was elevated, hence he had CTA chest on 2023 IR lung biopsy:  Final Pathologic Diagnosis:   A. Epicardial fat mass; ultrasound guided needle biopsy:   -     METASTATIC RENAL CELL CARCINOMA. B. Epicardial fat mass; ultrasound guided aspirate for cell block:   -     Mostly blood and rare tumor cells  Fluid cytology from left pleural effusion in progress. Clinically he is not candidate for metastasectomy. He has intermediate prognostic factors. He has Hg  10.4. PS > 80%. WBC, plat, LD and calcium wnl. Recurrrent after >1 year. I will look for clinical trial and order CARIS study. We discuss about preferred choice of treatment based on NCCN. I offer axitinib and pembroluzimab. Potential SE discuss with him. He has one daughter. Spouse . No family history of cancer.

## 2023-10-25 DIAGNOSIS — C64.2 RENAL CELL CARCINOMA OF LEFT KIDNEY (HCC): Primary | ICD-10-CM

## 2023-10-27 ENCOUNTER — HOSPITAL ENCOUNTER (OUTPATIENT)
Dept: INFUSION THERAPY | Age: 63
Discharge: HOME | End: 2023-10-27
Payer: COMMERCIAL

## 2023-10-27 ENCOUNTER — OFFICE VISIT (OUTPATIENT)
Dept: ONCOLOGY | Age: 63
End: 2023-10-27

## 2023-10-27 VITALS
OXYGEN SATURATION: 97 % | DIASTOLIC BLOOD PRESSURE: 66 MMHG | RESPIRATION RATE: 14 BRPM | WEIGHT: 153.2 LBS | TEMPERATURE: 98 F | SYSTOLIC BLOOD PRESSURE: 132 MMHG | HEIGHT: 72 IN | HEART RATE: 73 BPM | BODY MASS INDEX: 20.75 KG/M2

## 2023-10-27 DIAGNOSIS — C64.9 PRIMARY MALIGNANT NEOPLASM OF KIDNEY WITH METASTASIS FROM KIDNEY TO OTHER SITE, UNSPECIFIED LATERALITY (HCC): ICD-10-CM

## 2023-10-27 DIAGNOSIS — Z29.8 IMMUNOTHERAPY: ICD-10-CM

## 2023-10-27 DIAGNOSIS — D64.9 ANEMIA, UNSPECIFIED TYPE: ICD-10-CM

## 2023-10-27 DIAGNOSIS — C64.2 RENAL CELL CARCINOMA OF LEFT KIDNEY (HCC): Primary | ICD-10-CM

## 2023-10-27 DIAGNOSIS — C64.9 METASTATIC RENAL CELL CARCINOMA, UNSPECIFIED LATERALITY (HCC): ICD-10-CM

## 2023-10-27 LAB
BASOPHILS ABSOLUTE: 0.1 K/CU MM
BASOPHILS RELATIVE PERCENT: 1.1 % (ref 0–1)
DIFFERENTIAL TYPE: ABNORMAL
EOSINOPHILS ABSOLUTE: 0.4 K/CU MM
EOSINOPHILS RELATIVE PERCENT: 6.7 % (ref 0–3)
HCT VFR BLD CALC: 32.4 % (ref 42–52)
HEMOGLOBIN: 10.1 GM/DL (ref 13.5–18)
LYMPHOCYTES ABSOLUTE: 1.9 K/CU MM
LYMPHOCYTES RELATIVE PERCENT: 29.4 % (ref 24–44)
MCH RBC QN AUTO: 28.6 PG (ref 27–31)
MCHC RBC AUTO-ENTMCNC: 31.2 % (ref 32–36)
MCV RBC AUTO: 91.8 FL (ref 78–100)
MONOCYTES ABSOLUTE: 0.7 K/CU MM
MONOCYTES RELATIVE PERCENT: 10.7 % (ref 0–4)
PDW BLD-RTO: 14 % (ref 11.7–14.9)
PLATELET # BLD: 282 K/CU MM (ref 140–440)
PMV BLD AUTO: 8.7 FL (ref 7.5–11.1)
RBC # BLD: 3.53 M/CU MM (ref 4.6–6.2)
REASON FOR REJECTION: NORMAL
REJECTED TEST: NORMAL
SEGMENTED NEUTROPHILS ABSOLUTE COUNT: 3.4 K/CU MM
SEGMENTED NEUTROPHILS RELATIVE PERCENT: 52.1 % (ref 36–66)
WBC # BLD: 6.5 K/CU MM (ref 4–10.5)

## 2023-10-27 PROCEDURE — 99211 OFF/OP EST MAY X REQ PHY/QHP: CPT

## 2023-10-27 PROCEDURE — 36415 COLL VENOUS BLD VENIPUNCTURE: CPT

## 2023-10-27 PROCEDURE — 85025 COMPLETE CBC W/AUTO DIFF WBC: CPT

## 2023-10-27 PROCEDURE — 80053 COMPREHEN METABOLIC PANEL: CPT

## 2023-10-27 NOTE — PROGRESS NOTES
MA Rooming Questions  Patient: Julio C Rivero  MRN: [id number]    Date: 10/27/2023        1. Do you have any new issues?   no         2. Do you need any refills on medications?    no    3. Have you had any imaging done since your last visit? yes -     4. Have you been hospitalized or seen in the emergency room since your last visit here?   no    5. Did the patient have a depression screening completed today?  No    No data recorded     PHQ-9 Given to (if applicable):               PHQ-9 Score (if applicable):                     [] Positive     []  Negative              Does question #9 need addressed (if applicable)                     [] Yes    []  No               Quentin Uribe MA

## 2023-11-06 ENCOUNTER — APPOINTMENT (OUTPATIENT)
Dept: CT IMAGING | Age: 63
DRG: 683 | End: 2023-11-06
Payer: COMMERCIAL

## 2023-11-06 ENCOUNTER — APPOINTMENT (OUTPATIENT)
Dept: GENERAL RADIOLOGY | Age: 63
DRG: 683 | End: 2023-11-06
Payer: COMMERCIAL

## 2023-11-06 ENCOUNTER — HOSPITAL ENCOUNTER (INPATIENT)
Age: 63
LOS: 3 days | Discharge: HOME HEALTH | DRG: 683 | End: 2023-11-09
Attending: INTERNAL MEDICINE | Admitting: INTERNAL MEDICINE
Payer: COMMERCIAL

## 2023-11-06 DIAGNOSIS — J18.9 PNEUMONIA OF LEFT LOWER LOBE DUE TO INFECTIOUS ORGANISM: ICD-10-CM

## 2023-11-06 DIAGNOSIS — R53.1 GENERAL WEAKNESS: ICD-10-CM

## 2023-11-06 DIAGNOSIS — N17.9 AKI (ACUTE KIDNEY INJURY) (HCC): Primary | ICD-10-CM

## 2023-11-06 DIAGNOSIS — E86.0 DEHYDRATION: ICD-10-CM

## 2023-11-06 LAB
ALBUMIN SERPL-MCNC: 3.3 GM/DL (ref 3.4–5)
ALP BLD-CCNC: 119 IU/L (ref 40–129)
ALT SERPL-CCNC: 50 U/L (ref 10–40)
ANION GAP SERPL CALCULATED.3IONS-SCNC: 17 MMOL/L (ref 4–16)
AST SERPL-CCNC: 59 IU/L (ref 15–37)
BACTERIA: NEGATIVE /HPF
BASOPHILS ABSOLUTE: 0.1 K/CU MM
BASOPHILS RELATIVE PERCENT: 0.9 % (ref 0–1)
BILIRUB SERPL-MCNC: 0.7 MG/DL (ref 0–1)
BILIRUBIN URINE: ABNORMAL MG/DL
BLOOD, URINE: ABNORMAL
BUN SERPL-MCNC: 53 MG/DL (ref 6–23)
CALCIUM SERPL-MCNC: 9.5 MG/DL (ref 8.3–10.6)
CHLORIDE BLD-SCNC: 98 MMOL/L (ref 99–110)
CLARITY: CLEAR
CO2: 20 MMOL/L (ref 21–32)
COLOR: YELLOW
CREAT SERPL-MCNC: 2.2 MG/DL (ref 0.9–1.3)
DIFFERENTIAL TYPE: ABNORMAL
EOSINOPHILS ABSOLUTE: 0.2 K/CU MM
EOSINOPHILS RELATIVE PERCENT: 2.3 % (ref 0–3)
GFR SERPL CREATININE-BSD FRML MDRD: 33 ML/MIN/1.73M2
GLUCOSE SERPL-MCNC: 114 MG/DL (ref 70–99)
GLUCOSE, URINE: NEGATIVE MG/DL
HCT VFR BLD CALC: 31.9 % (ref 42–52)
HEMOGLOBIN: 9.8 GM/DL (ref 13.5–18)
IMMATURE NEUTROPHIL %: 0.4 % (ref 0–0.43)
KETONES, URINE: ABNORMAL MG/DL
LACTIC ACID, SEPSIS: 1.1 MMOL/L (ref 0.4–2)
LACTIC ACID, SEPSIS: 1.5 MMOL/L (ref 0.4–2)
LEUKOCYTE ESTERASE, URINE: NEGATIVE
LYMPHOCYTES ABSOLUTE: 1.4 K/CU MM
LYMPHOCYTES RELATIVE PERCENT: 19.6 % (ref 24–44)
MAGNESIUM: 2.1 MG/DL (ref 1.8–2.4)
MCH RBC QN AUTO: 28.1 PG (ref 27–31)
MCHC RBC AUTO-ENTMCNC: 30.7 % (ref 32–36)
MCV RBC AUTO: 91.4 FL (ref 78–100)
MONOCYTES ABSOLUTE: 0.8 K/CU MM
MONOCYTES RELATIVE PERCENT: 11.8 % (ref 0–4)
MUCUS: ABNORMAL HPF
NITRITE URINE, QUANTITATIVE: NEGATIVE
NUCLEATED RBC %: 0 %
PDW BLD-RTO: 13.2 % (ref 11.7–14.9)
PH, URINE: 5.5 (ref 5–8)
PLATELET # BLD: 446 K/CU MM (ref 140–440)
PMV BLD AUTO: 9.4 FL (ref 7.5–11.1)
POTASSIUM SERPL-SCNC: 4.7 MMOL/L (ref 3.5–5.1)
PROTEIN UA: 100 MG/DL
RBC # BLD: 3.49 M/CU MM (ref 4.6–6.2)
RBC URINE: 2 /HPF (ref 0–3)
SARS-COV-2 RDRP RESP QL NAA+PROBE: NOT DETECTED
SEGMENTED NEUTROPHILS ABSOLUTE COUNT: 4.6 K/CU MM
SEGMENTED NEUTROPHILS RELATIVE PERCENT: 65 % (ref 36–66)
SODIUM BLD-SCNC: 135 MMOL/L (ref 135–145)
SOURCE: NORMAL
SPECIFIC GRAVITY UA: 1.02 (ref 1–1.03)
SQUAMOUS EPITHELIAL: <1 /HPF
TOTAL IMMATURE NEUTOROPHIL: 0.03 K/CU MM
TOTAL NUCLEATED RBC: 0 K/CU MM
TOTAL PROTEIN: 8.4 GM/DL (ref 6.4–8.2)
TRICHOMONAS: ABNORMAL /HPF
TROPONIN, HIGH SENSITIVITY: 40 NG/L (ref 0–22)
TROPONIN, HIGH SENSITIVITY: 41 NG/L (ref 0–22)
UROBILINOGEN, URINE: 2 MG/DL (ref 0.2–1)
WBC # BLD: 7 K/CU MM (ref 4–10.5)
WBC UA: 3 /HPF (ref 0–2)

## 2023-11-06 PROCEDURE — 96361 HYDRATE IV INFUSION ADD-ON: CPT

## 2023-11-06 PROCEDURE — 85025 COMPLETE CBC W/AUTO DIFF WBC: CPT

## 2023-11-06 PROCEDURE — 70450 CT HEAD/BRAIN W/O DYE: CPT

## 2023-11-06 PROCEDURE — 83735 ASSAY OF MAGNESIUM: CPT

## 2023-11-06 PROCEDURE — 80053 COMPREHEN METABOLIC PANEL: CPT

## 2023-11-06 PROCEDURE — 99285 EMERGENCY DEPT VISIT HI MDM: CPT

## 2023-11-06 PROCEDURE — 2580000003 HC RX 258: Performed by: INTERNAL MEDICINE

## 2023-11-06 PROCEDURE — 84484 ASSAY OF TROPONIN QUANT: CPT

## 2023-11-06 PROCEDURE — 74176 CT ABD & PELVIS W/O CONTRAST: CPT

## 2023-11-06 PROCEDURE — 81001 URINALYSIS AUTO W/SCOPE: CPT

## 2023-11-06 PROCEDURE — 2580000003 HC RX 258: Performed by: NURSE PRACTITIONER

## 2023-11-06 PROCEDURE — 87086 URINE CULTURE/COLONY COUNT: CPT

## 2023-11-06 PROCEDURE — 96374 THER/PROPH/DIAG INJ IV PUSH: CPT

## 2023-11-06 PROCEDURE — 6360000002 HC RX W HCPCS: Performed by: INTERNAL MEDICINE

## 2023-11-06 PROCEDURE — 36415 COLL VENOUS BLD VENIPUNCTURE: CPT

## 2023-11-06 PROCEDURE — 93005 ELECTROCARDIOGRAM TRACING: CPT | Performed by: EMERGENCY MEDICINE

## 2023-11-06 PROCEDURE — 6360000002 HC RX W HCPCS: Performed by: NURSE PRACTITIONER

## 2023-11-06 PROCEDURE — 6370000000 HC RX 637 (ALT 250 FOR IP): Performed by: INTERNAL MEDICINE

## 2023-11-06 PROCEDURE — 2580000003 HC RX 258: Performed by: EMERGENCY MEDICINE

## 2023-11-06 PROCEDURE — 72125 CT NECK SPINE W/O DYE: CPT

## 2023-11-06 PROCEDURE — 71045 X-RAY EXAM CHEST 1 VIEW: CPT

## 2023-11-06 PROCEDURE — 1200000000 HC SEMI PRIVATE

## 2023-11-06 PROCEDURE — 87040 BLOOD CULTURE FOR BACTERIA: CPT

## 2023-11-06 PROCEDURE — 87635 SARS-COV-2 COVID-19 AMP PRB: CPT

## 2023-11-06 PROCEDURE — 83605 ASSAY OF LACTIC ACID: CPT

## 2023-11-06 RX ORDER — GABAPENTIN 300 MG/1
300 CAPSULE ORAL 2 TIMES DAILY
Status: DISCONTINUED | OUTPATIENT
Start: 2023-11-06 | End: 2023-11-09 | Stop reason: HOSPADM

## 2023-11-06 RX ORDER — ATORVASTATIN CALCIUM 10 MG/1
20 TABLET, FILM COATED ORAL NIGHTLY
Status: DISCONTINUED | OUTPATIENT
Start: 2023-11-06 | End: 2023-11-09 | Stop reason: HOSPADM

## 2023-11-06 RX ORDER — BACLOFEN 10 MG/1
10 TABLET ORAL 2 TIMES DAILY PRN
Status: DISCONTINUED | OUTPATIENT
Start: 2023-11-06 | End: 2023-11-09 | Stop reason: HOSPADM

## 2023-11-06 RX ORDER — METHOCARBAMOL 500 MG/1
1000 TABLET, FILM COATED ORAL 4 TIMES DAILY
Status: DISCONTINUED | OUTPATIENT
Start: 2023-11-06 | End: 2023-11-09 | Stop reason: HOSPADM

## 2023-11-06 RX ORDER — POLYETHYLENE GLYCOL 3350 17 G/17G
17 POWDER, FOR SOLUTION ORAL DAILY PRN
Status: DISCONTINUED | OUTPATIENT
Start: 2023-11-06 | End: 2023-11-09 | Stop reason: HOSPADM

## 2023-11-06 RX ORDER — ONDANSETRON 4 MG/1
4 TABLET, ORALLY DISINTEGRATING ORAL EVERY 8 HOURS PRN
Status: DISCONTINUED | OUTPATIENT
Start: 2023-11-06 | End: 2023-11-09 | Stop reason: HOSPADM

## 2023-11-06 RX ORDER — TRAMADOL HYDROCHLORIDE 50 MG/1
50 TABLET ORAL EVERY 6 HOURS PRN
Status: DISCONTINUED | OUTPATIENT
Start: 2023-11-06 | End: 2023-11-09 | Stop reason: HOSPADM

## 2023-11-06 RX ORDER — SODIUM CHLORIDE 9 MG/ML
INJECTION, SOLUTION INTRAVENOUS CONTINUOUS
Status: DISPENSED | OUTPATIENT
Start: 2023-11-06 | End: 2023-11-07

## 2023-11-06 RX ORDER — FENTANYL CITRATE 50 UG/ML
25 INJECTION, SOLUTION INTRAMUSCULAR; INTRAVENOUS
Status: DISCONTINUED | OUTPATIENT
Start: 2023-11-06 | End: 2023-11-06 | Stop reason: HOSPADM

## 2023-11-06 RX ORDER — SODIUM CHLORIDE 9 MG/ML
INJECTION, SOLUTION INTRAVENOUS PRN
Status: DISCONTINUED | OUTPATIENT
Start: 2023-11-06 | End: 2023-11-09 | Stop reason: HOSPADM

## 2023-11-06 RX ORDER — ACETAMINOPHEN 650 MG/1
650 SUPPOSITORY RECTAL EVERY 6 HOURS PRN
Status: DISCONTINUED | OUTPATIENT
Start: 2023-11-06 | End: 2023-11-09 | Stop reason: HOSPADM

## 2023-11-06 RX ORDER — SODIUM CHLORIDE 0.9 % (FLUSH) 0.9 %
5-40 SYRINGE (ML) INJECTION PRN
Status: DISCONTINUED | OUTPATIENT
Start: 2023-11-06 | End: 2023-11-09 | Stop reason: HOSPADM

## 2023-11-06 RX ORDER — ENOXAPARIN SODIUM 100 MG/ML
40 INJECTION SUBCUTANEOUS DAILY
Status: DISCONTINUED | OUTPATIENT
Start: 2023-11-06 | End: 2023-11-09 | Stop reason: HOSPADM

## 2023-11-06 RX ORDER — POTASSIUM CHLORIDE 20 MEQ/1
40 TABLET, EXTENDED RELEASE ORAL PRN
Status: DISCONTINUED | OUTPATIENT
Start: 2023-11-06 | End: 2023-11-09 | Stop reason: HOSPADM

## 2023-11-06 RX ORDER — ONDANSETRON 2 MG/ML
4 INJECTION INTRAMUSCULAR; INTRAVENOUS EVERY 6 HOURS PRN
Status: DISCONTINUED | OUTPATIENT
Start: 2023-11-06 | End: 2023-11-09 | Stop reason: HOSPADM

## 2023-11-06 RX ORDER — 0.9 % SODIUM CHLORIDE 0.9 %
1000 INTRAVENOUS SOLUTION INTRAVENOUS ONCE
Status: COMPLETED | OUTPATIENT
Start: 2023-11-06 | End: 2023-11-06

## 2023-11-06 RX ORDER — POTASSIUM CHLORIDE 7.45 MG/ML
10 INJECTION INTRAVENOUS PRN
Status: DISCONTINUED | OUTPATIENT
Start: 2023-11-06 | End: 2023-11-09 | Stop reason: HOSPADM

## 2023-11-06 RX ORDER — ACETAMINOPHEN 325 MG/1
650 TABLET ORAL EVERY 6 HOURS PRN
Status: DISCONTINUED | OUTPATIENT
Start: 2023-11-06 | End: 2023-11-09 | Stop reason: HOSPADM

## 2023-11-06 RX ORDER — MAGNESIUM SULFATE IN WATER 40 MG/ML
2000 INJECTION, SOLUTION INTRAVENOUS PRN
Status: DISCONTINUED | OUTPATIENT
Start: 2023-11-06 | End: 2023-11-09 | Stop reason: HOSPADM

## 2023-11-06 RX ORDER — SODIUM CHLORIDE 0.9 % (FLUSH) 0.9 %
5-40 SYRINGE (ML) INJECTION EVERY 12 HOURS SCHEDULED
Status: DISCONTINUED | OUTPATIENT
Start: 2023-11-06 | End: 2023-11-09 | Stop reason: HOSPADM

## 2023-11-06 RX ADMIN — SODIUM CHLORIDE: 9 INJECTION, SOLUTION INTRAVENOUS at 17:08

## 2023-11-06 RX ADMIN — AZITHROMYCIN MONOHYDRATE 500 MG: 500 INJECTION, POWDER, LYOPHILIZED, FOR SOLUTION INTRAVENOUS at 14:42

## 2023-11-06 RX ADMIN — GABAPENTIN 300 MG: 300 CAPSULE ORAL at 21:31

## 2023-11-06 RX ADMIN — METHOCARBAMOL TABLETS 1000 MG: 500 TABLET, COATED ORAL at 17:06

## 2023-11-06 RX ADMIN — ENOXAPARIN SODIUM 40 MG: 100 INJECTION SUBCUTANEOUS at 17:06

## 2023-11-06 RX ADMIN — TRAMADOL HYDROCHLORIDE 50 MG: 50 TABLET, COATED ORAL at 23:46

## 2023-11-06 RX ADMIN — FENTANYL CITRATE 25 MCG: 50 INJECTION INTRAMUSCULAR; INTRAVENOUS at 13:48

## 2023-11-06 RX ADMIN — ATORVASTATIN CALCIUM 20 MG: 10 TABLET, FILM COATED ORAL at 21:31

## 2023-11-06 RX ADMIN — SODIUM CHLORIDE 1000 ML: 9 INJECTION, SOLUTION INTRAVENOUS at 10:21

## 2023-11-06 RX ADMIN — METHOCARBAMOL TABLETS 1000 MG: 500 TABLET, COATED ORAL at 21:30

## 2023-11-06 RX ADMIN — BACLOFEN 10 MG: 10 TABLET ORAL at 21:30

## 2023-11-06 RX ADMIN — TRAMADOL HYDROCHLORIDE 50 MG: 50 TABLET, COATED ORAL at 17:06

## 2023-11-06 RX ADMIN — CEFTRIAXONE 1000 MG: 1 INJECTION, POWDER, FOR SOLUTION INTRAMUSCULAR; INTRAVENOUS at 14:02

## 2023-11-06 ASSESSMENT — PAIN DESCRIPTION - DESCRIPTORS
DESCRIPTORS: SORE
DESCRIPTORS: SHARP;SORE
DESCRIPTORS: ACHING;SORE
DESCRIPTORS: ACHING;SORE

## 2023-11-06 ASSESSMENT — PAIN DESCRIPTION - ORIENTATION
ORIENTATION: LEFT

## 2023-11-06 ASSESSMENT — PAIN - FUNCTIONAL ASSESSMENT
PAIN_FUNCTIONAL_ASSESSMENT: PREVENTS OR INTERFERES SOME ACTIVE ACTIVITIES AND ADLS
PAIN_FUNCTIONAL_ASSESSMENT: PREVENTS OR INTERFERES SOME ACTIVE ACTIVITIES AND ADLS

## 2023-11-06 ASSESSMENT — PAIN SCALES - GENERAL
PAINLEVEL_OUTOF10: 7
PAINLEVEL_OUTOF10: 9
PAINLEVEL_OUTOF10: 7

## 2023-11-06 ASSESSMENT — PAIN DESCRIPTION - LOCATION
LOCATION: NECK;BACK
LOCATION: GENERALIZED

## 2023-11-06 ASSESSMENT — PAIN DESCRIPTION - PAIN TYPE: TYPE: ACUTE PAIN

## 2023-11-06 ASSESSMENT — PAIN DESCRIPTION - DIRECTION: RADIATING_TOWARDS: LEFT

## 2023-11-06 ASSESSMENT — PAIN DESCRIPTION - FREQUENCY: FREQUENCY: CONTINUOUS

## 2023-11-06 ASSESSMENT — PAIN DESCRIPTION - ONSET: ONSET: PROGRESSIVE

## 2023-11-06 NOTE — ED NOTES
Pt complaining of chest pain and dizziness. Vitals and EKG repeated. Dr Jenelle Cortez made aware. Fluids started.       Lynn Valente RN  11/06/23 [id number]

## 2023-11-06 NOTE — ED NOTES
Medication History  [facility]    Patient Name: Lacy Schmitt 1960     Medication history has been completed by: Paul Briceño CPhT    Source(s) of information: patient and insurance claims     Primary Care Physician: Willis Dubon MD     Pharmacy: [facility]    Allergies as of 11/06/2023    (No Known Allergies)        Prior to Admission medications    Medication Sig Start Date End Date Taking? Authorizing Provider   baclofen (LIORESAL) 10 MG tablet Take 1 tablet by mouth 2 times daily as needed (pain) 8/28/23   Regina Cramer MD   ondansetron (ZOFRAN-ODT) 4 MG disintegrating tablet Take 1 tablet by mouth every 8 hours as needed for Nausea or Vomiting 8/28/23   Regina Cramer MD   loperamide (IMODIUM A-D) 2 MG tablet 2 tablets after first loose stool then take 1 tablet with each additional loose stool. NOT TO EXCEED 8 TABLETS DAILY 8/28/23   Regina Cramer MD   traMADol (ULTRAM) 50 MG tablet Take 1 tablet by mouth every 6 hours as needed for Pain. ProviderUmu MD   meloxicam (MOBIC) 15 MG tablet Take 1 tablet by mouth daily    Umu Lopez MD   gabapentin (NEURONTIN) 300 MG capsule Take 1 capsule by mouth 2 times daily. 2/10/21   Umu Lopez MD   simvastatin (ZOCOR) 40 MG tablet take 1 tablet by mouth once daily 8/30/19   Umu Lopez MD     Comments:  Medication list reviewed with patient and insurance claims verified. Patient reports he has taken no medications prior to ER visit. Claims for Albuterol inhaler only uses as needed.     To my knowledge the above medication history is accurate as of 11/6/2023 1:41 PM.   Paul Briceño CPhT   11/6/2023 1:41 PM

## 2023-11-06 NOTE — H&P
V2.0  History and Physical      Name:  Yadira Law /Age/Sex: 1960  (57 y.o. male)   MRN & CSN:  [id number] & [id number] Encounter Date/Time: 2023 3:42 PM EST   Location:  ED21/ED-21 PCP: Benjamin Varghese MD       Hospital Day: 1    Assessment and Plan:   Yadira Law is a 57 y.o. male with a pmh of denies depression or clear-cell renal cell carcinoma, who presents with CHAIM (acute kidney injury) [facility]    Hospital Problems             Last Modified POA    * (Principal) CHAIM (acute kidney injury) ([address]) 2023 Yes         Neck spasm  CT cervical spine showing straightening of cervical lordosis which may reflect muscle spasm  Patient having difficulty with neck movement for about 5 days. Favoring left side and unable to move his neck to the right side  Previously had similar issue and had improvement with steroid injection with PCP  Start Robaxin 1000 mg 3 times daily  Continue home baclofen    History of renal cell carcinoma  With left lung involvement  Follows with Dr. Javier Gresham outpatient  Initially diagnosed in  s/p left robotic radical nephrectomy  Was found to have likely lung involvement  CTA chest done on 2023. IR lung biopsy showed metastatic renal cell carcinoma  Fluid cytology from left pleural effusion negative for malignancy at that time  Underwent CT abdomen pelvis without contrast today which shows nodular pulmonary consolidation, pleural effusion and rib destruction left lower thorax. These findings appear similar to  July.   We will hold off on broad-spectrum antibiotics now  Consult Dr. Reymundo Porter     CHAIM  Having decreased intake last several days  Creatinine on presentation 2.2 mg/DL  Received IVF in the emergency room  Creatinine at baseline    Chronic anemia  Hemoglobin at baseline        Disposition:   Current Living situation:   Expected Disposition:   Estimated D/C:     Diet No diet orders on file   DVT Prophylaxis [] Lovenox, []  Heparin, [] SCDs, []

## 2023-11-06 NOTE — ED NOTES
ED TO INPATIENT SBAR HANDOFF    Patient Name: Varghese Kelly   :  1960  57 y.o. Preferred Name  Kaya Schmid Present yes   Restraints no   C-SSRS: Risk of Suicide: No Risk  Sitter no   Sepsis Risk Score Sepsis Risk Score: 1.26      Situation  Chief Complaint   Patient presents with    Dehydration     Family states that pt has not eaten the last 3 days, has had unsteady gait and has been leaning to the left the past couple of days     Brief Description of Patient's Condition: Patient came in with dehydration. Has history of cancer, which is why he is in research study  Mental Status: oriented, alert, coherent, logical, thought processes intact, and able to concentrate and follow conversation  Arrived from: home    Imaging:   XR CHEST PORTABLE   Final Result   Increased density left basilar region consistent with pulmonary   consolidation, pleural effusion, underlying neoplasm. Loss of   anterior-lateral aspect left 8th rib noted consistent with previously   reported bone destruction on abdomen pelvis CT 2023. Contrast-enhanced   chest CT would be helpful for further evaluation. Otherwise, no detectable   acute radiographic abnormality. CT CSpine W/O Contrast   Final Result   Straightening of cervical lordosis which may reflect muscle spasm or patient   positioning. Multilevel degenerative disc disease, foraminal stenosis and   exiting nerve effacement C4-C5 through C6-C7. Septal thickening and mild subpleural infiltrative changes left apical   region. Correlation with clinical evidence of left pneumonia or underlying   chronic lung disease suggested. Nodular mucosal thickening or mucous debris proximal trachea noted. CT ABDOMEN PELVIS WO CONTRAST Additional Contrast? None   Final Result   Nodular pulmonary consolidation, pleural effusion and rib destruction left   lower thorax.   Findings are consistent with pneumonia with associated   empyema, underlying

## 2023-11-06 NOTE — ACP (ADVANCE CARE PLANNING)
Patient does not have any ACP documents/Medical Power of . LSW notes hospital will follow [place] Next of Kin hierarchy in the following descending order for priority:    Guardian  Spouse  Majority of adult Children  Parents  Majority of adult Siblings  Nearest Relative not described above    Per [place] Next of Kin hierarchy: Patients' adult child will be [address]

## 2023-11-07 ENCOUNTER — APPOINTMENT (OUTPATIENT)
Dept: MRI IMAGING | Age: 63
DRG: 683 | End: 2023-11-07
Payer: COMMERCIAL

## 2023-11-07 PROBLEM — M54.2 NECK PAIN: Status: ACTIVE | Noted: 2023-11-07

## 2023-11-07 PROBLEM — C64.9 METASTATIC RENAL CELL CARCINOMA (HCC): Status: ACTIVE | Noted: 2023-11-07

## 2023-11-07 PROBLEM — M43.6 LEFT TORTICOLLIS: Status: ACTIVE | Noted: 2023-11-07

## 2023-11-07 PROBLEM — E44.0 MODERATE MALNUTRITION (HCC): Status: ACTIVE | Noted: 2023-11-07

## 2023-11-07 LAB
ANION GAP SERPL CALCULATED.3IONS-SCNC: 14 MMOL/L (ref 4–16)
BASOPHILS ABSOLUTE: 0.1 K/CU MM
BASOPHILS RELATIVE PERCENT: 1.2 % (ref 0–1)
BUN SERPL-MCNC: 51 MG/DL (ref 6–23)
CALCIUM SERPL-MCNC: 8.8 MG/DL (ref 8.3–10.6)
CHLORIDE BLD-SCNC: 102 MMOL/L (ref 99–110)
CO2: 19 MMOL/L (ref 21–32)
CREAT SERPL-MCNC: 1.9 MG/DL (ref 0.9–1.3)
CULTURE: NORMAL
DIFFERENTIAL TYPE: ABNORMAL
EKG ATRIAL RATE: 102 BPM
EKG ATRIAL RATE: 104 BPM
EKG DIAGNOSIS: NORMAL
EKG DIAGNOSIS: NORMAL
EKG P AXIS: 80 DEGREES
EKG P AXIS: 85 DEGREES
EKG P-R INTERVAL: 136 MS
EKG P-R INTERVAL: 138 MS
EKG Q-T INTERVAL: 368 MS
EKG Q-T INTERVAL: 406 MS
EKG QRS DURATION: 82 MS
EKG QRS DURATION: 88 MS
EKG QTC CALCULATION (BAZETT): 479 MS
EKG QTC CALCULATION (BAZETT): 533 MS
EKG R AXIS: 62 DEGREES
EKG R AXIS: 67 DEGREES
EKG T AXIS: 58 DEGREES
EKG T AXIS: 71 DEGREES
EKG VENTRICULAR RATE: 102 BPM
EKG VENTRICULAR RATE: 104 BPM
EOSINOPHILS ABSOLUTE: 0 K/CU MM
EOSINOPHILS RELATIVE PERCENT: 0.2 % (ref 0–3)
GFR SERPL CREATININE-BSD FRML MDRD: 39 ML/MIN/1.73M2
GLUCOSE SERPL-MCNC: 86 MG/DL (ref 70–99)
HCT VFR BLD CALC: 25.4 % (ref 42–52)
HEMOGLOBIN: 7.8 GM/DL (ref 13.5–18)
IMMATURE NEUTROPHIL %: 0.2 % (ref 0–0.43)
LYMPHOCYTES ABSOLUTE: 1.1 K/CU MM
LYMPHOCYTES RELATIVE PERCENT: 25.1 % (ref 24–44)
Lab: NORMAL
MCH RBC QN AUTO: 28.1 PG (ref 27–31)
MCHC RBC AUTO-ENTMCNC: 30.7 % (ref 32–36)
MCV RBC AUTO: 91.4 FL (ref 78–100)
MONOCYTES ABSOLUTE: 0.6 K/CU MM
MONOCYTES RELATIVE PERCENT: 14.2 % (ref 0–4)
NUCLEATED RBC %: 0 %
PDW BLD-RTO: 13.2 % (ref 11.7–14.9)
PLATELET # BLD: 372 K/CU MM (ref 140–440)
PMV BLD AUTO: 9.1 FL (ref 7.5–11.1)
POTASSIUM SERPL-SCNC: 4.8 MMOL/L (ref 3.5–5.1)
RBC # BLD: 2.78 M/CU MM (ref 4.6–6.2)
SEGMENTED NEUTROPHILS ABSOLUTE COUNT: 2.6 K/CU MM
SEGMENTED NEUTROPHILS RELATIVE PERCENT: 59.1 % (ref 36–66)
SODIUM BLD-SCNC: 135 MMOL/L (ref 135–145)
SPECIMEN: NORMAL
TOTAL IMMATURE NEUTOROPHIL: 0.01 K/CU MM
TOTAL NUCLEATED RBC: 0 K/CU MM
WBC # BLD: 4.3 K/CU MM (ref 4–10.5)

## 2023-11-07 PROCEDURE — 6370000000 HC RX 637 (ALT 250 FOR IP): Performed by: INTERNAL MEDICINE

## 2023-11-07 PROCEDURE — 70551 MRI BRAIN STEM W/O DYE: CPT

## 2023-11-07 PROCEDURE — 85025 COMPLETE CBC W/AUTO DIFF WBC: CPT

## 2023-11-07 PROCEDURE — 80048 BASIC METABOLIC PNL TOTAL CA: CPT

## 2023-11-07 PROCEDURE — APPNB60 APP NON BILLABLE TIME 46-60 MINS: Performed by: PHYSICIAN ASSISTANT

## 2023-11-07 PROCEDURE — 70540 MRI ORBIT/FACE/NECK W/O DYE: CPT

## 2023-11-07 PROCEDURE — 97166 OT EVAL MOD COMPLEX 45 MIN: CPT

## 2023-11-07 PROCEDURE — 97116 GAIT TRAINING THERAPY: CPT

## 2023-11-07 PROCEDURE — 36415 COLL VENOUS BLD VENIPUNCTURE: CPT

## 2023-11-07 PROCEDURE — 97162 PT EVAL MOD COMPLEX 30 MIN: CPT

## 2023-11-07 PROCEDURE — 94761 N-INVAS EAR/PLS OXIMETRY MLT: CPT

## 2023-11-07 PROCEDURE — 97535 SELF CARE MNGMENT TRAINING: CPT

## 2023-11-07 PROCEDURE — 1200000000 HC SEMI PRIVATE

## 2023-11-07 PROCEDURE — 99223 1ST HOSP IP/OBS HIGH 75: CPT | Performed by: STUDENT IN AN ORGANIZED HEALTH CARE EDUCATION/TRAINING PROGRAM

## 2023-11-07 PROCEDURE — 93010 ELECTROCARDIOGRAM REPORT: CPT | Performed by: INTERNAL MEDICINE

## 2023-11-07 PROCEDURE — 2580000003 HC RX 258: Performed by: INTERNAL MEDICINE

## 2023-11-07 PROCEDURE — 99223 1ST HOSP IP/OBS HIGH 75: CPT | Performed by: INTERNAL MEDICINE

## 2023-11-07 PROCEDURE — 6360000002 HC RX W HCPCS: Performed by: INTERNAL MEDICINE

## 2023-11-07 PROCEDURE — 2580000003 HC RX 258: Performed by: STUDENT IN AN ORGANIZED HEALTH CARE EDUCATION/TRAINING PROGRAM

## 2023-11-07 PROCEDURE — 72141 MRI NECK SPINE W/O DYE: CPT

## 2023-11-07 PROCEDURE — 6360000002 HC RX W HCPCS: Performed by: STUDENT IN AN ORGANIZED HEALTH CARE EDUCATION/TRAINING PROGRAM

## 2023-11-07 RX ORDER — SODIUM CHLORIDE 9 MG/ML
INJECTION, SOLUTION INTRAVENOUS CONTINUOUS
Status: DISCONTINUED | OUTPATIENT
Start: 2023-11-07 | End: 2023-11-09 | Stop reason: HOSPADM

## 2023-11-07 RX ORDER — KETOROLAC TROMETHAMINE 30 MG/ML
15 INJECTION, SOLUTION INTRAMUSCULAR; INTRAVENOUS EVERY 6 HOURS PRN
Status: DISCONTINUED | OUTPATIENT
Start: 2023-11-07 | End: 2023-11-09 | Stop reason: HOSPADM

## 2023-11-07 RX ADMIN — METHOCARBAMOL TABLETS 1000 MG: 500 TABLET, COATED ORAL at 13:30

## 2023-11-07 RX ADMIN — SODIUM CHLORIDE, PRESERVATIVE FREE 10 ML: 5 INJECTION INTRAVENOUS at 21:48

## 2023-11-07 RX ADMIN — KETOROLAC TROMETHAMINE 15 MG: 30 INJECTION, SOLUTION INTRAMUSCULAR; INTRAVENOUS at 10:30

## 2023-11-07 RX ADMIN — SODIUM CHLORIDE, PRESERVATIVE FREE 10 ML: 5 INJECTION INTRAVENOUS at 09:59

## 2023-11-07 RX ADMIN — METHYLPREDNISOLONE SODIUM SUCCINATE 40 MG: 40 INJECTION, POWDER, FOR SOLUTION INTRAMUSCULAR; INTRAVENOUS at 10:30

## 2023-11-07 RX ADMIN — SODIUM CHLORIDE: 9 INJECTION, SOLUTION INTRAVENOUS at 13:30

## 2023-11-07 RX ADMIN — METHOCARBAMOL TABLETS 1000 MG: 500 TABLET, COATED ORAL at 21:46

## 2023-11-07 RX ADMIN — ATORVASTATIN CALCIUM 20 MG: 10 TABLET, FILM COATED ORAL at 21:47

## 2023-11-07 RX ADMIN — ENOXAPARIN SODIUM 40 MG: 100 INJECTION SUBCUTANEOUS at 09:58

## 2023-11-07 RX ADMIN — GABAPENTIN 300 MG: 300 CAPSULE ORAL at 21:46

## 2023-11-07 ASSESSMENT — PAIN SCALES - GENERAL
PAINLEVEL_OUTOF10: 6
PAINLEVEL_OUTOF10: 6

## 2023-11-07 ASSESSMENT — PAIN DESCRIPTION - ORIENTATION: ORIENTATION: LEFT

## 2023-11-07 ASSESSMENT — PAIN DESCRIPTION - LOCATION
LOCATION: BACK;NECK
LOCATION: NECK

## 2023-11-07 ASSESSMENT — PAIN DESCRIPTION - DESCRIPTORS
DESCRIPTORS: ACHING
DESCRIPTORS: SORE;SHARP

## 2023-11-07 NOTE — CARE COORDINATION
11/07/23 1226   Service Assessment   Patient Orientation Person;Situation; Other (see comment)  (having trouble keeping his toughts staight.)   Cognition Alert   History Provided By Patient;Medical Record   Primary Caregiver Self   Support Systems Children  (daughter William Tellez)   Prior Functional Level Independent in ADLs/IADLs   Current Functional Level Assistance with the following:   Can patient return to prior living arrangement Unknown at present   Ability to make needs known: Good   Family able to assist with home care needs: Other (comment)  (not known)   Would you like for me to discuss the discharge plan with any other family members/significant others, and if so, who? No  (when asked if CM could call his daughter he said he did not want that.)   Social/Functional History   Lives With Alone   Type of [address] [place] [address] One level   Home Equipment Walker, rolling  (shower chair)   Active  Yes  (states daughter will help as needed.)   Mode of Transportation Car   Condition of Participation: Discharge Planning   The Patient and/or Patient Representative was provided with a Choice of Provider? Patient   The Patient and/Or Patient Representative agree with the Discharge Plan? Yes   Freedom of Choice list was provided with basic dialogue that supports the patient's individualized plan of care/goals, treatment preferences, and shares the quality data associated with the providers? Yes     Reviewed chart, discussed in IDR and spoke with pt about discharge needs/plans. Pt lives alone. He does his own ADL's and transportation. Pt has a PCP and insurance that covers medications. Daughter lives close but not able to help a lot at home. We discussed short term SNU vs home with [facility] [place], [facility] not sure what he will need. Awaiting PT/OT magda. Will give pt PPO SNU and HC list to review. CM will continue to follow.

## 2023-11-07 NOTE — ED PROVIDER NOTES
EKG shows sinus tachycardia. Normal axis with poor R wave progression. Artifact obscures interpretation to some degree. However there is no gross ST elevation or depression. This is compared to prior tracing.      Simran Alegria,   11/06/23 2500
dehydration. There is mild transaminitis which is chronic, no leukocytosis and mild chronic anemia. Magnesium was normal, troponin x2 were slightly elevated at 40 and 41 but no significant delta change. COVID, sepsis negative. Urinalysis does not appear concerning for infection. Chest x-ray shows increasing density in the left basilar region consistent with pulmonary consolidation, pleural effusion and underlying neoplasm. Loss of anterior lateral aspect of the left eighth rib noted consistent with previously reported bone destruction. CT shows some straightening of the cervical lordosis reflecting muscle spasm but no other acute findings. A CT of the abdomen and pelvis again reflects the pulmonary changes as well as multiple hypodense liver masses reflecting cyst versus underlying malignancy. CT head did not show any acute findings. Discussed results with patient. We will treat for pneumonia although these symptoms could be significant for worsening malignancy. Patient will be admitted for further evaluation and care, oncology and nephrology consultation for CHAIM. I am the Primary Clinician of Record. CLINICAL IMPRESSION      1. CHAIM (acute kidney injury) ([address])    2. Dehydration    3. General weakness    4. Pneumonia of left lower lobe due to infectious organism          DISPOSITION/PLAN   DISPOSITION Admitted 11/06/2023 01:57:00 PM      PATIENT REFERREDTO:  No follow-up provider specified.     DISCHARGE MEDICATIONS:  Current Discharge Medication List          DISCONTINUED MEDICATIONS:  Current Discharge Medication List                 (Please note that portions ofthis note were completed with a voice recognition program.  Efforts were made to edit the dictations but occasionally words are mis-transcribed.)    MARK ANTHONY Rivas CNP (electronically signed)              MARK ANTHONY Rivas CNP  11/06/23 0008

## 2023-11-08 PROBLEM — D63.8 ANEMIA OF CHRONIC DISEASE: Status: ACTIVE | Noted: 2023-08-09

## 2023-11-08 LAB
ANION GAP SERPL CALCULATED.3IONS-SCNC: 13 MMOL/L (ref 4–16)
BASOPHILS ABSOLUTE: 0 K/CU MM
BASOPHILS RELATIVE PERCENT: 0.3 % (ref 0–1)
BUN SERPL-MCNC: 49 MG/DL (ref 6–23)
CALCIUM SERPL-MCNC: 9.1 MG/DL (ref 8.3–10.6)
CHLORIDE BLD-SCNC: 105 MMOL/L (ref 99–110)
CO2: 20 MMOL/L (ref 21–32)
CREAT SERPL-MCNC: 1.3 MG/DL (ref 0.9–1.3)
DIFFERENTIAL TYPE: ABNORMAL
EOSINOPHILS ABSOLUTE: 0 K/CU MM
EOSINOPHILS RELATIVE PERCENT: 0 % (ref 0–3)
GFR SERPL CREATININE-BSD FRML MDRD: >60 ML/MIN/1.73M2
GLUCOSE SERPL-MCNC: 206 MG/DL (ref 70–99)
HCT VFR BLD CALC: 26.8 % (ref 42–52)
HEMOGLOBIN: 8.4 GM/DL (ref 13.5–18)
IMMATURE NEUTROPHIL %: 0.3 % (ref 0–0.43)
LYMPHOCYTES ABSOLUTE: 0.4 K/CU MM
LYMPHOCYTES RELATIVE PERCENT: 12.9 % (ref 24–44)
MCH RBC QN AUTO: 28.2 PG (ref 27–31)
MCHC RBC AUTO-ENTMCNC: 31.3 % (ref 32–36)
MCV RBC AUTO: 89.9 FL (ref 78–100)
MONOCYTES ABSOLUTE: 0.1 K/CU MM
MONOCYTES RELATIVE PERCENT: 2.6 % (ref 0–4)
NUCLEATED RBC %: 0 %
PDW BLD-RTO: 12.9 % (ref 11.7–14.9)
PLATELET # BLD: 448 K/CU MM (ref 140–440)
PMV BLD AUTO: 9.4 FL (ref 7.5–11.1)
POTASSIUM SERPL-SCNC: 5.1 MMOL/L (ref 3.5–5.1)
RBC # BLD: 2.98 M/CU MM (ref 4.6–6.2)
SEGMENTED NEUTROPHILS ABSOLUTE COUNT: 2.9 K/CU MM
SEGMENTED NEUTROPHILS RELATIVE PERCENT: 83.9 % (ref 36–66)
SODIUM BLD-SCNC: 138 MMOL/L (ref 135–145)
TOTAL IMMATURE NEUTOROPHIL: 0.01 K/CU MM
TOTAL NUCLEATED RBC: 0 K/CU MM
WBC # BLD: 3.4 K/CU MM (ref 4–10.5)

## 2023-11-08 PROCEDURE — 6360000002 HC RX W HCPCS: Performed by: INTERNAL MEDICINE

## 2023-11-08 PROCEDURE — 1200000000 HC SEMI PRIVATE

## 2023-11-08 PROCEDURE — 6370000000 HC RX 637 (ALT 250 FOR IP): Performed by: INTERNAL MEDICINE

## 2023-11-08 PROCEDURE — 80048 BASIC METABOLIC PNL TOTAL CA: CPT

## 2023-11-08 PROCEDURE — 94761 N-INVAS EAR/PLS OXIMETRY MLT: CPT

## 2023-11-08 PROCEDURE — 76937 US GUIDE VASCULAR ACCESS: CPT

## 2023-11-08 PROCEDURE — 2580000003 HC RX 258: Performed by: INTERNAL MEDICINE

## 2023-11-08 PROCEDURE — 85025 COMPLETE CBC W/AUTO DIFF WBC: CPT

## 2023-11-08 PROCEDURE — 92610 EVALUATE SWALLOWING FUNCTION: CPT

## 2023-11-08 PROCEDURE — 36415 COLL VENOUS BLD VENIPUNCTURE: CPT

## 2023-11-08 RX ADMIN — SODIUM CHLORIDE, PRESERVATIVE FREE 10 ML: 5 INJECTION INTRAVENOUS at 08:24

## 2023-11-08 RX ADMIN — ATORVASTATIN CALCIUM 20 MG: 10 TABLET, FILM COATED ORAL at 21:57

## 2023-11-08 RX ADMIN — METHOCARBAMOL TABLETS 1000 MG: 500 TABLET, COATED ORAL at 18:07

## 2023-11-08 RX ADMIN — GABAPENTIN 300 MG: 300 CAPSULE ORAL at 08:40

## 2023-11-08 RX ADMIN — ENOXAPARIN SODIUM 40 MG: 100 INJECTION SUBCUTANEOUS at 08:21

## 2023-11-08 RX ADMIN — GABAPENTIN 300 MG: 300 CAPSULE ORAL at 21:57

## 2023-11-08 RX ADMIN — METHOCARBAMOL TABLETS 1000 MG: 500 TABLET, COATED ORAL at 21:57

## 2023-11-08 RX ADMIN — METHOCARBAMOL TABLETS 1000 MG: 500 TABLET, COATED ORAL at 14:20

## 2023-11-08 RX ADMIN — METHOCARBAMOL TABLETS 1000 MG: 500 TABLET, COATED ORAL at 08:20

## 2023-11-08 ASSESSMENT — PAIN DESCRIPTION - DESCRIPTORS
DESCRIPTORS: THROBBING;TENDER;DISCOMFORT
DESCRIPTORS: ACHING;NAGGING;THROBBING
DESCRIPTORS: ACHING;THROBBING
DESCRIPTORS: THROBBING;STABBING

## 2023-11-08 ASSESSMENT — PAIN DESCRIPTION - LOCATION
LOCATION: SHOULDER;NECK;RIB CAGE
LOCATION: NECK;SHOULDER

## 2023-11-08 ASSESSMENT — PAIN SCALES - GENERAL
PAINLEVEL_OUTOF10: 0
PAINLEVEL_OUTOF10: 5

## 2023-11-08 ASSESSMENT — PAIN - FUNCTIONAL ASSESSMENT: PAIN_FUNCTIONAL_ASSESSMENT: ACTIVITIES ARE NOT PREVENTED

## 2023-11-08 ASSESSMENT — PAIN DESCRIPTION - ORIENTATION
ORIENTATION: RIGHT;LEFT

## 2023-11-08 NOTE — CARE COORDINATION
Reviewed chart , discussed in IDR, then spoke with pt. He is agreeable to SNU, reviewing PPO SNU list. CM will revisit later today. 1700 Spoke with pt/daughter Linda Galdamez. He does not want SNU, he plans on discharging to [address] . Has RW and BSC already. Discussed [facility] agencies and would like HC.

## 2023-11-09 ENCOUNTER — TELEPHONE (OUTPATIENT)
Dept: FAMILY MEDICINE CLINIC | Age: 63
End: 2023-11-09

## 2023-11-09 VITALS
WEIGHT: 147.93 LBS | BODY MASS INDEX: 20.04 KG/M2 | HEART RATE: 79 BPM | SYSTOLIC BLOOD PRESSURE: 121 MMHG | OXYGEN SATURATION: 96 % | RESPIRATION RATE: 16 BRPM | DIASTOLIC BLOOD PRESSURE: 66 MMHG | TEMPERATURE: 97.9 F | HEIGHT: 72 IN

## 2023-11-09 DIAGNOSIS — C64.9 PRIMARY MALIGNANT NEOPLASM OF KIDNEY WITH METASTASIS FROM KIDNEY TO OTHER SITE, UNSPECIFIED LATERALITY (HCC): ICD-10-CM

## 2023-11-09 DIAGNOSIS — C64.2 RENAL CELL CARCINOMA OF LEFT KIDNEY (HCC): ICD-10-CM

## 2023-11-09 LAB
ANION GAP SERPL CALCULATED.3IONS-SCNC: 10 MMOL/L (ref 4–16)
BASOPHILS ABSOLUTE: 0 K/CU MM
BASOPHILS RELATIVE PERCENT: 0.5 % (ref 0–1)
BUN SERPL-MCNC: 42 MG/DL (ref 6–23)
CALCIUM SERPL-MCNC: 9.1 MG/DL (ref 8.3–10.6)
CHLORIDE BLD-SCNC: 106 MMOL/L (ref 99–110)
CO2: 25 MMOL/L (ref 21–32)
CREAT SERPL-MCNC: 1.1 MG/DL (ref 0.9–1.3)
DIFFERENTIAL TYPE: ABNORMAL
EOSINOPHILS ABSOLUTE: 0 K/CU MM
EOSINOPHILS RELATIVE PERCENT: 0 % (ref 0–3)
GFR SERPL CREATININE-BSD FRML MDRD: >60 ML/MIN/1.73M2
GLUCOSE SERPL-MCNC: 119 MG/DL (ref 70–99)
HCT VFR BLD CALC: 28.5 % (ref 42–52)
HEMOGLOBIN: 8.6 GM/DL (ref 13.5–18)
IMMATURE NEUTROPHIL %: 0.3 % (ref 0–0.43)
LYMPHOCYTES ABSOLUTE: 1.5 K/CU MM
LYMPHOCYTES RELATIVE PERCENT: 20.5 % (ref 24–44)
MCH RBC QN AUTO: 28.2 PG (ref 27–31)
MCHC RBC AUTO-ENTMCNC: 30.2 % (ref 32–36)
MCV RBC AUTO: 93.4 FL (ref 78–100)
MONOCYTES ABSOLUTE: 0.5 K/CU MM
MONOCYTES RELATIVE PERCENT: 6.8 % (ref 0–4)
NUCLEATED RBC %: 0 %
PDW BLD-RTO: 13.2 % (ref 11.7–14.9)
PLATELET # BLD: 500 K/CU MM (ref 140–440)
PMV BLD AUTO: 9.4 FL (ref 7.5–11.1)
POTASSIUM SERPL-SCNC: 4.8 MMOL/L (ref 3.5–5.1)
RBC # BLD: 3.05 M/CU MM (ref 4.6–6.2)
SEGMENTED NEUTROPHILS ABSOLUTE COUNT: 5.4 K/CU MM
SEGMENTED NEUTROPHILS RELATIVE PERCENT: 71.9 % (ref 36–66)
SODIUM BLD-SCNC: 141 MMOL/L (ref 135–145)
TOTAL IMMATURE NEUTOROPHIL: 0.02 K/CU MM
TOTAL NUCLEATED RBC: 0 K/CU MM
WBC # BLD: 7.5 K/CU MM (ref 4–10.5)

## 2023-11-09 PROCEDURE — 97535 SELF CARE MNGMENT TRAINING: CPT

## 2023-11-09 PROCEDURE — 2580000003 HC RX 258: Performed by: INTERNAL MEDICINE

## 2023-11-09 PROCEDURE — 6370000000 HC RX 637 (ALT 250 FOR IP): Performed by: INTERNAL MEDICINE

## 2023-11-09 PROCEDURE — 97530 THERAPEUTIC ACTIVITIES: CPT

## 2023-11-09 PROCEDURE — 85025 COMPLETE CBC W/AUTO DIFF WBC: CPT

## 2023-11-09 PROCEDURE — 36415 COLL VENOUS BLD VENIPUNCTURE: CPT

## 2023-11-09 PROCEDURE — 6360000002 HC RX W HCPCS: Performed by: INTERNAL MEDICINE

## 2023-11-09 PROCEDURE — 2580000003 HC RX 258: Performed by: STUDENT IN AN ORGANIZED HEALTH CARE EDUCATION/TRAINING PROGRAM

## 2023-11-09 PROCEDURE — 80048 BASIC METABOLIC PNL TOTAL CA: CPT

## 2023-11-09 RX ORDER — KETOROLAC TROMETHAMINE 10 MG/1
10 TABLET, FILM COATED ORAL EVERY 6 HOURS PRN
Qty: 20 TABLET | Refills: 0 | Status: SHIPPED | OUTPATIENT
Start: 2023-11-09 | End: 2024-11-08

## 2023-11-09 RX ORDER — LOPERAMIDE HYDROCHLORIDE 2 MG/1
TABLET ORAL
Qty: 90 TABLET | Refills: 0 | Status: SHIPPED | OUTPATIENT
Start: 2023-11-09

## 2023-11-09 RX ORDER — METHOCARBAMOL 1000 MG/1
1000 TABLET, COATED ORAL 4 TIMES DAILY
Qty: 10 TABLET | Refills: 0 | Status: SHIPPED | OUTPATIENT
Start: 2023-11-09 | End: 2023-11-19

## 2023-11-09 RX ADMIN — ENOXAPARIN SODIUM 40 MG: 100 INJECTION SUBCUTANEOUS at 08:01

## 2023-11-09 RX ADMIN — SODIUM CHLORIDE 1000 ML: 9 INJECTION, SOLUTION INTRAVENOUS at 08:03

## 2023-11-09 RX ADMIN — SODIUM CHLORIDE, PRESERVATIVE FREE 10 ML: 5 INJECTION INTRAVENOUS at 08:01

## 2023-11-09 RX ADMIN — GABAPENTIN 300 MG: 300 CAPSULE ORAL at 08:00

## 2023-11-09 RX ADMIN — METHOCARBAMOL TABLETS 1000 MG: 500 TABLET, COATED ORAL at 08:00

## 2023-11-09 ASSESSMENT — PAIN DESCRIPTION - LOCATION: LOCATION: NECK;RIB CAGE

## 2023-11-09 ASSESSMENT — PAIN SCALES - GENERAL: PAINLEVEL_OUTOF10: 5

## 2023-11-09 ASSESSMENT — PAIN DESCRIPTION - ORIENTATION: ORIENTATION: RIGHT;LEFT

## 2023-11-09 ASSESSMENT — PAIN DESCRIPTION - DESCRIPTORS: DESCRIPTORS: THROBBING;ACHING;NAGGING

## 2023-11-09 NOTE — CARE COORDINATION
Pt going to [facility] at [address] [place] [address]. Her number is [phone]. Verified address & pcp. Will initiate hhc as pt is dc'd.

## 2023-11-09 NOTE — DISCHARGE SUMMARY
V2.0  Discharge Summary    Name:  Yadira Law /Age/Sex: 1960 (57 y.o. male)   Admit Date: 2023  Discharge Date: 23    MRN & CSN:  [id number] & [id number] Encounter Date and Time 23 11:15 AM EST    Attending:  Vero Armstrong MD Discharging Provider: Vero Armstrong MD       Hospital Course:     Brief HPI:     Yadira Law is a 57 y.o. male with pmh of renal cell carcinoma, previous history of neck spasm presents with decreased intake last few days as well as right neck spasm. Patient lives alone and he goes to his daughter's house for meals. Last several days he has not been there and his daughter has been bringing over food. Daughter is also present in the room and assisting with history. He states he has decreased intake but has been eating some. Over the last 5 days, he started developing right-sided neck spasms. He had previous history of neck spasm which was relieved after getting injection at his PCPs office. He currently denies any symptoms of cough, sputum production, fevers or chills, urinary symptoms. As his daughter had limited interaction with her father, she just noticed the spasm today and her initial thought was him having stroke as he was favoring his left side, hence bring him over for evaluation. Brief Problem Based Course:         Neck pain/ spasm, resolved  CT cervical spine showing straightening of cervical lordosis which may reflect muscle spasm  Patient having difficulty with neck movement for about 5 days. Favoring left side and unable to move his neck to the right side  Previously had similar issue and had improvement with steroid injection with PCP  Started on Robaxin 1000 mg 3 times daily  Continue home baclofen  Toradol as needed. Status post one-time dose of Solu-Medrol  Neurology consulted and recommendations appreciated  PT OT SNF versus ARU.   Patient opted to go home with home health care     History of renal cell carcinoma  With left lung
General: denies fever, chills  Eyes: denies visual changes, blurred vision  CV: denies chest pain, palpitations  Resp: +difficulty breathing, +cough, +PARKER  Abdominal: denies nausea, vomiting, diarrhea, abdominal pain  : denies urinary pain or discharge  MSK: denies muscle aches, leg swelling  Neuro: denies headaches, numbness, tingling  Skin: denies rashes, bruises

## 2023-11-09 NOTE — PLAN OF CARE
Problem: Discharge Planning  Goal: Discharge to home or other facility with appropriate resources  11/7/2023 1944 by Booker Gilbert RN  Outcome: Progressing  Flowsheets (Taken 11/7/2023 1104)  Discharge to home or other facility with appropriate resources: Identify barriers to discharge with patient and caregiver  11/7/2023 0756 by Marlen Lozoya  Outcome: Progressing     Problem: Pain  Goal: Verbalizes/displays adequate comfort level or baseline comfort level  11/7/2023 1944 by Booker Gilbert RN  Outcome: Progressing  11/7/2023 0756 by Marlen Lozoya  Outcome: Progressing     Problem: Safety - Adult  Goal: Free from fall injury  11/7/2023 1944 by Booker Gilbert RN  Outcome: Progressing  11/7/2023 0756 by Marlen Lozoya  Outcome: Progressing     Problem: ABCDS Injury Assessment  Goal: Absence of physical injury  11/7/2023 1944 by Booker Gilbert RN  Outcome: Progressing  11/7/2023 0756 by Marlen Lozoya  Outcome: Progressing     Problem: Skin/Tissue Integrity - Adult  Goal: Skin integrity remains intact  11/7/2023 1944 by Booker Gilbert RN  Outcome: Progressing  Flowsheets (Taken 11/7/2023 1000)  Skin Integrity Remains Intact: Monitor for areas of redness and/or skin breakdown  11/7/2023 0756 by Marlen Lozoya  Outcome: Progressing     Problem: Musculoskeletal - Adult  Goal: Return mobility to safest level of function  11/7/2023 1944 by Booker Gilbert RN  Outcome: Progressing  11/7/2023 0756 by Marlen Lozoya  Outcome: Progressing  Goal: Maintain proper alignment of affected body part  11/7/2023 1944 by Booker Gilbert RN  Outcome: Progressing  11/7/2023 0756 by Marlen Lozoya  Outcome: Progressing  Goal: Return ADL status to a safe level of function  11/7/2023 1944 by Booker Gilbert RN  Outcome: Progressing  11/7/2023 0756 by Marlen Lozoya  Outcome: Progressing     Problem: Gastrointestinal - Adult  Goal: Maintains adequate nutritional intake  11/7/2023 1944 by Booker Gilbert RN  Outcome:
Problem: Discharge Planning  Goal: Discharge to home or other facility with appropriate resources  Outcome: Adequate for Discharge     Problem: Pain  Goal: Verbalizes/displays adequate comfort level or baseline comfort level  Outcome: Adequate for Discharge     Problem: Safety - Adult  Goal: Free from fall injury  Outcome: Adequate for Discharge     Problem: ABCDS Injury Assessment  Goal: Absence of physical injury  Outcome: Adequate for Discharge     Problem: Skin/Tissue Integrity - Adult  Goal: Skin integrity remains intact  Outcome: Adequate for Discharge  Flowsheets (Taken 11/9/2023 0985)  Skin Integrity Remains Intact: Monitor for areas of redness and/or skin breakdown     Problem: Musculoskeletal - Adult  Goal: Return mobility to safest level of function  Outcome: Adequate for Discharge  Goal: Maintain proper alignment of affected body part  Outcome: Adequate for Discharge  Goal: Return ADL status to a safe level of function  Outcome: Adequate for Discharge     Problem: Gastrointestinal - Adult  Goal: Maintains adequate nutritional intake  Outcome: Adequate for Discharge     Problem: Genitourinary - Adult  Goal: Absence of urinary retention  Outcome: Adequate for Discharge     Problem: Metabolic/Fluid and Electrolytes - Adult  Goal: Electrolytes maintained within normal limits  Outcome: Adequate for Discharge  Goal: Hemodynamic stability and optimal renal function maintained  Outcome: Adequate for Discharge     Problem: Nutrition Deficit:  Goal: Optimize nutritional status  Outcome: Adequate for Discharge     Problem: Skin/Tissue Integrity  Goal: Absence of new skin breakdown  Description: 1. Monitor for areas of redness and/or skin breakdown  2. Assess vascular access sites hourly  3. Every 4-6 hours minimum:  Change oxygen saturation probe site  4.   Every 4-6 hours:  If on nasal continuous positive airway pressure, respiratory therapy assess nares and determine need for appliance change or resting
Problem: Discharge Planning  Goal: Discharge to home or other facility with appropriate resources  Outcome: Progressing     Problem: Pain  Goal: Verbalizes/displays adequate comfort level or baseline comfort level  Outcome: Progressing     Problem: Safety - Adult  Goal: Free from fall injury  Outcome: Progressing     Problem: ABCDS Injury Assessment  Goal: Absence of physical injury  Outcome: Progressing     Problem: Skin/Tissue Integrity - Adult  Goal: Skin integrity remains intact  Outcome: Progressing     Problem: Musculoskeletal - Adult  Goal: Return mobility to safest level of function  Outcome: Progressing  Goal: Maintain proper alignment of affected body part  Outcome: Progressing  Goal: Return ADL status to a safe level of function  Outcome: Progressing     Problem: Gastrointestinal - Adult  Goal: Maintains adequate nutritional intake  Outcome: Progressing     Problem: Genitourinary - Adult  Goal: Absence of urinary retention  Outcome: Progressing     Problem: Metabolic/Fluid and Electrolytes - Adult  Goal: Electrolytes maintained within normal limits  Outcome: Progressing  Goal: Hemodynamic stability and optimal renal function maintained  Outcome: Progressing
Problem: Discharge Planning  Goal: Discharge to home or other facility with appropriate resources  Outcome: Progressing     Problem: Pain  Goal: Verbalizes/displays adequate comfort level or baseline comfort level  Outcome: Progressing     Problem: Safety - Adult  Goal: Free from fall injury  Outcome: Progressing     Problem: ABCDS Injury Assessment  Goal: Absence of physical injury  Outcome: Progressing     Problem: Skin/Tissue Integrity - Adult  Goal: Skin integrity remains intact  Outcome: Progressing  Flowsheets (Taken 11/8/2023 1127)  Skin Integrity Remains Intact: Monitor for areas of redness and/or skin breakdown     Problem: Musculoskeletal - Adult  Goal: Return mobility to safest level of function  Outcome: Progressing  Goal: Maintain proper alignment of affected body part  Outcome: Progressing  Goal: Return ADL status to a safe level of function  Outcome: Progressing     Problem: Gastrointestinal - Adult  Goal: Maintains adequate nutritional intake  Outcome: Progressing     Problem: Genitourinary - Adult  Goal: Absence of urinary retention  Outcome: Progressing     Problem: Metabolic/Fluid and Electrolytes - Adult  Goal: Electrolytes maintained within normal limits  Outcome: Progressing  Goal: Hemodynamic stability and optimal renal function maintained  Outcome: Progressing     Problem: Nutrition Deficit:  Goal: Optimize nutritional status  Outcome: Progressing     Problem: Skin/Tissue Integrity  Goal: Absence of new skin breakdown  Description: 1. Monitor for areas of redness and/or skin breakdown  2. Assess vascular access sites hourly  3. Every 4-6 hours minimum:  Change oxygen saturation probe site  4. Every 4-6 hours:  If on nasal continuous positive airway pressure, respiratory therapy assess nares and determine need for appliance change or resting period.   Outcome: Progressing
Metabolic/Fluid and Electrolytes - Adult  Goal: Electrolytes maintained within normal limits  11/7/2023 0756 by Debbie Schrader  Outcome: Progressing  11/7/2023 0024 by Abigail Johnson RN  Outcome: Progressing  Goal: Hemodynamic stability and optimal renal function maintained  11/7/2023 0756 by Debbie Schrader  Outcome: Progressing  11/7/2023 0024 by Abigail Johnson RN  Outcome: Progressing

## 2023-11-09 NOTE — CARE COORDINATION
Reviewed chart , pt on discharge home with HC. PS to Rebecca Flowers with [facility] this am at 0913, awaiting her answer if they can take pt.   Updated pt's nurse Safia

## 2023-11-09 NOTE — TELEPHONE ENCOUNTER
Patient to be D/C 11-10-23 from [facility] to home. Will need [address] &[address].   Call Antonio with a Verbal

## 2023-11-11 LAB
CULTURE: NORMAL
CULTURE: NORMAL
Lab: NORMAL
Lab: NORMAL
SPECIMEN: NORMAL
SPECIMEN: NORMAL

## 2023-11-14 ENCOUNTER — OFFICE VISIT (OUTPATIENT)
Dept: ONCOLOGY | Age: 63
End: 2023-11-14
Payer: COMMERCIAL

## 2023-11-14 ENCOUNTER — HOSPITAL ENCOUNTER (OUTPATIENT)
Dept: INFUSION THERAPY | Age: 63
Discharge: HOME | End: 2023-11-14
Payer: COMMERCIAL

## 2023-11-14 VITALS
OXYGEN SATURATION: 99 % | DIASTOLIC BLOOD PRESSURE: 63 MMHG | SYSTOLIC BLOOD PRESSURE: 95 MMHG | WEIGHT: 139.6 LBS | HEIGHT: 72 IN | HEART RATE: 107 BPM | TEMPERATURE: 97.8 F | RESPIRATION RATE: 14 BRPM | BODY MASS INDEX: 18.91 KG/M2

## 2023-11-14 DIAGNOSIS — C64.9 PRIMARY MALIGNANT NEOPLASM OF KIDNEY WITH METASTASIS FROM KIDNEY TO OTHER SITE, UNSPECIFIED LATERALITY (HCC): Primary | ICD-10-CM

## 2023-11-14 DIAGNOSIS — C78.00 MALIGNANT NEOPLASM OF KIDNEY METASTATIC TO LUNG (HCC): Primary | ICD-10-CM

## 2023-11-14 DIAGNOSIS — N17.9 AKI (ACUTE KIDNEY INJURY) (HCC): ICD-10-CM

## 2023-11-14 DIAGNOSIS — D63.8 ANEMIA OF CHRONIC DISEASE: ICD-10-CM

## 2023-11-14 DIAGNOSIS — C64.9 MALIGNANT NEOPLASM OF KIDNEY METASTATIC TO LUNG (HCC): Primary | ICD-10-CM

## 2023-11-14 DIAGNOSIS — C78.00 MALIGNANT NEOPLASM OF KIDNEY METASTATIC TO LUNG (HCC): ICD-10-CM

## 2023-11-14 DIAGNOSIS — C64.9 MALIGNANT NEOPLASM OF KIDNEY METASTATIC TO LUNG (HCC): ICD-10-CM

## 2023-11-14 LAB
ALBUMIN SERPL-MCNC: 3.4 GM/DL (ref 3.4–5)
ALP BLD-CCNC: 117 IU/L (ref 40–129)
ALT SERPL-CCNC: 59 U/L (ref 10–40)
ANION GAP SERPL CALCULATED.3IONS-SCNC: 16 MMOL/L (ref 4–16)
AST SERPL-CCNC: 58 IU/L (ref 15–37)
BASOPHILS ABSOLUTE: 0 K/CU MM
BASOPHILS RELATIVE PERCENT: 0.4 % (ref 0–1)
BILIRUB SERPL-MCNC: 0.3 MG/DL (ref 0–1)
BUN SERPL-MCNC: 15 MG/DL (ref 6–23)
CALCIUM SERPL-MCNC: 9.6 MG/DL (ref 8.3–10.6)
CHLORIDE BLD-SCNC: 92 MMOL/L (ref 99–110)
CO2: 24 MMOL/L (ref 21–32)
CREAT SERPL-MCNC: 1.1 MG/DL (ref 0.9–1.3)
DIFFERENTIAL TYPE: ABNORMAL
EOSINOPHILS ABSOLUTE: 0.5 K/CU MM
EOSINOPHILS RELATIVE PERCENT: 5.6 % (ref 0–3)
GFR SERPL CREATININE-BSD FRML MDRD: >60 ML/MIN/1.73M2
GLUCOSE SERPL-MCNC: 100 MG/DL (ref 70–99)
HCT VFR BLD CALC: 33.8 % (ref 42–52)
HEMOGLOBIN: 10.8 GM/DL (ref 13.5–18)
LYMPHOCYTES ABSOLUTE: 2.1 K/CU MM
LYMPHOCYTES RELATIVE PERCENT: 24.9 % (ref 24–44)
MCH RBC QN AUTO: 28.3 PG (ref 27–31)
MCHC RBC AUTO-ENTMCNC: 32 % (ref 32–36)
MCV RBC AUTO: 88.7 FL (ref 78–100)
MONOCYTES ABSOLUTE: 1 K/CU MM
MONOCYTES RELATIVE PERCENT: 12.7 % (ref 0–4)
PDW BLD-RTO: 14.2 % (ref 11.7–14.9)
PLATELET # BLD: 549 K/CU MM (ref 140–440)
PMV BLD AUTO: 8.9 FL (ref 7.5–11.1)
POTASSIUM SERPL-SCNC: 5.4 MMOL/L (ref 3.5–5.1)
RBC # BLD: 3.81 M/CU MM (ref 4.6–6.2)
SEGMENTED NEUTROPHILS ABSOLUTE COUNT: 4.6 K/CU MM
SEGMENTED NEUTROPHILS RELATIVE PERCENT: 56.4 % (ref 36–66)
SODIUM BLD-SCNC: 132 MMOL/L (ref 135–145)
TOTAL PROTEIN: 7.5 GM/DL (ref 6.4–8.2)
TSH SERPL DL<=0.005 MIU/L-ACNC: 0.42 UIU/ML (ref 0.27–4.2)
WBC # BLD: 8.2 K/CU MM (ref 4–10.5)

## 2023-11-14 PROCEDURE — 84443 ASSAY THYROID STIM HORMONE: CPT

## 2023-11-14 PROCEDURE — 1036F TOBACCO NON-USER: CPT | Performed by: INTERNAL MEDICINE

## 2023-11-14 PROCEDURE — 85025 COMPLETE CBC W/AUTO DIFF WBC: CPT

## 2023-11-14 PROCEDURE — 36415 COLL VENOUS BLD VENIPUNCTURE: CPT

## 2023-11-14 PROCEDURE — 99211 OFF/OP EST MAY X REQ PHY/QHP: CPT

## 2023-11-14 PROCEDURE — 80053 COMPREHEN METABOLIC PANEL: CPT

## 2023-11-14 PROCEDURE — G8482 FLU IMMUNIZE ORDER/ADMIN: HCPCS | Performed by: INTERNAL MEDICINE

## 2023-11-14 PROCEDURE — 3017F COLORECTAL CA SCREEN DOC REV: CPT | Performed by: INTERNAL MEDICINE

## 2023-11-14 PROCEDURE — 1111F DSCHRG MED/CURRENT MED MERGE: CPT | Performed by: INTERNAL MEDICINE

## 2023-11-14 PROCEDURE — G8427 DOCREV CUR MEDS BY ELIG CLIN: HCPCS | Performed by: INTERNAL MEDICINE

## 2023-11-14 PROCEDURE — G8420 CALC BMI NORM PARAMETERS: HCPCS | Performed by: INTERNAL MEDICINE

## 2023-11-14 PROCEDURE — 99214 OFFICE O/P EST MOD 30 MIN: CPT | Performed by: INTERNAL MEDICINE

## 2023-11-14 RX ORDER — PROCHLORPERAZINE MALEATE 10 MG
10 TABLET ORAL EVERY 8 HOURS PRN
Qty: 30 TABLET | Refills: 3 | Status: SHIPPED | OUTPATIENT
Start: 2023-11-14

## 2023-11-14 RX ORDER — TRAMADOL HYDROCHLORIDE 50 MG/1
50 TABLET ORAL EVERY 8 HOURS PRN
Qty: 45 TABLET | Refills: 0 | Status: SHIPPED | OUTPATIENT
Start: 2023-11-14 | End: 2023-11-29

## 2023-11-14 RX ORDER — PANTOPRAZOLE SODIUM 40 MG/1
40 TABLET, DELAYED RELEASE ORAL
Qty: 90 TABLET | Refills: 1 | Status: SHIPPED | OUTPATIENT
Start: 2023-11-14

## 2023-11-14 NOTE — PROGRESS NOTES
Pt enrolled in [employer] [id number] clinical trial, lab orders placed for CMP/CBCD/TSH/Urinalysis o be completed today 11/14/23 to confirm resolution of recent AEs prior to initiation of study drug (cabozantinib) provided by [employer].

## 2023-11-14 NOTE — PROGRESS NOTES
MA Rooming Questions  Patient: Jude Chester  MRN: [id number]    Date: 11/14/2023        1. Do you have any new issues? yes - pt was recently in the hospital & states his appetite is poor; has to force himself to eat         2. Do you need any refills on medications? yes - pain med    3. Have you had any imaging done since your last visit? yes - [facility] WHILE IN PT    4. Have you been hospitalized or seen in the emergency room since your last visit here?   yes - [facility] 11/6    5. Did the patient have a depression screening completed today?  No    No data recorded     PHQ-9 Given to (if applicable):               PHQ-9 Score (if applicable):                     [] Positive     []  Negative              Does question #9 need addressed (if applicable)                     [] Yes    []  No               Erin Parry MA

## 2023-11-15 ENCOUNTER — CLINICAL DOCUMENTATION (OUTPATIENT)
Dept: ONCOLOGY | Age: 63
End: 2023-11-15

## 2023-11-16 ENCOUNTER — CLINICAL DOCUMENTATION (OUTPATIENT)
Dept: INFUSION THERAPY | Age: 63
End: 2023-11-16

## 2023-11-16 NOTE — PROGRESS NOTES
Jamul [id number]  PD Inhibitor (nivolumab) and Ipilimumab Followed by Nivolumab vs. VEGF TKI Cabozantinib with Nivolumab: A Phase III Trial in Metastatic Untreated Renal Cell Cancer    Patient Initials & ID #: S,P #[id number]  Eligibility Confirmed: Friday 08/18/23  Study Registration: Monday 08/21/23  Progression on Induction Tx: Tuesday 10/24/23 (CT CAP @ [facility])    Pt presented to clinic Tuesday 11/14/23 for follow-up regarding recent hospitalization (11.06.23 - 11.09.23) and discussion concerning option of step-2 registration to cabozantinib per [id number] PD arm, pt daughter Orlin Kamara) now aware of pt's mRC Dx, this nurse confirmed permission to share PHI with daughter, pt stated he is agreeable for medical staff to share/discuss PHI w/ daughter as she is now aware of his current Dx, next steps for clinical trial discussed, questions & concerns addressed, plan for CBCD/CMP/TSH/Urinalysis to be completed @ today's OV to assess step-2 eligibility to begin C1D1 PD arm (Cabozantinib 60 mg PO daily until further progression or intolerable toxicity), if lab values are within study parameters and prior AE's have resolved </= to grade 1 pt will be eligible to proceed on [id number]. Labs will be resulted by tomorrow morning (11/15/23), requested pt RTC tomorrow (11/15/23) for additional education & initiation of C1D1 cabozantinib and collection of biorepository blood samples per [id number], pt agreeable to plan but has home health visit scheduled and is unsure of the time, requested pt confirm [facility] appt time tomorrow and RTC at time most convenient for him, this nurse informed pt once labs have been reviewed she would call to confirm status and planned RTC time. Cabozantinib medication sheet provided ([email or web address]), sample cycle calendar and direct contact information provided for reference.     11/15/23: CMP/CBCD/TSH reviewed from 11/14/23, all AEs resolved to </= G1, pt eligible to begin Tx per [id number] PD arm,

## 2023-11-17 ENCOUNTER — CLINICAL DOCUMENTATION (OUTPATIENT)
Dept: INFUSION THERAPY | Age: 63
End: 2023-11-17

## 2023-11-17 DIAGNOSIS — N17.9 AKI (ACUTE KIDNEY INJURY) (HCC): ICD-10-CM

## 2023-11-17 DIAGNOSIS — D63.8 ANEMIA OF CHRONIC DISEASE: ICD-10-CM

## 2023-11-17 DIAGNOSIS — E44.0 MODERATE MALNUTRITION (HCC): ICD-10-CM

## 2023-11-17 DIAGNOSIS — R53.83 OTHER FATIGUE: ICD-10-CM

## 2023-11-17 DIAGNOSIS — C64.2 RENAL CELL CARCINOMA OF LEFT KIDNEY METASTATIC TO OTHER SITE (HCC): ICD-10-CM

## 2023-11-17 DIAGNOSIS — E86.0 DEHYDRATION: Primary | ICD-10-CM

## 2023-11-17 DIAGNOSIS — C64.9 MALIGNANT NEOPLASM OF KIDNEY METASTATIC TO LUNG (HCC): Primary | ICD-10-CM

## 2023-11-17 DIAGNOSIS — C78.00 MALIGNANT NEOPLASM OF KIDNEY METASTATIC TO LUNG (HCC): Primary | ICD-10-CM

## 2023-11-17 DIAGNOSIS — Z29.8 IMMUNOTHERAPY: ICD-10-CM

## 2023-11-17 RX ORDER — 0.9 % SODIUM CHLORIDE 0.9 %
1000 INTRAVENOUS SOLUTION INTRAVENOUS ONCE
OUTPATIENT
Start: 2023-11-20 | End: 2023-11-20

## 2023-11-17 RX ORDER — HEPARIN SODIUM (PORCINE) LOCK FLUSH IV SOLN 100 UNIT/ML 100 UNIT/ML
500 SOLUTION INTRAVENOUS PRN
OUTPATIENT
Start: 2023-11-20

## 2023-11-17 RX ORDER — SODIUM CHLORIDE 9 MG/ML
5-250 INJECTION, SOLUTION INTRAVENOUS PRN
OUTPATIENT
Start: 2023-11-20

## 2023-11-17 RX ORDER — SODIUM CHLORIDE 0.9 % (FLUSH) 0.9 %
5-40 SYRINGE (ML) INJECTION PRN
OUTPATIENT
Start: 2023-11-20

## 2023-11-17 NOTE — PROGRESS NOTES
[place] [id number]  PD Inhibitor (nivolumab) and Ipilimumab Followed by Nivolumab vs. VEGF TKI Cabozantinib with Nivolumab:  A Phase III Trial in Metastatic Untreated Renal Cell Cancer     Patient Initials & ID #: S,P #[id number]  Eligibility Confirmed: Friday 08/18/23  Study Registration: Monday 08/21/23  Last Cycle Induction Tx: Tuesday 10/10/23 (C3D1)  Progression on Induction Tx: Tuesday 10/24/23 (CT CAP @ [address] [place] [address])    Received return call from pt today (11/17/23) @ 1627, pt states he is feeling OK, reports he is able to eat and drink and is trying his best to be consistent but intake is still lower then normal, order for nutritional supplement escribed to [facility], encouraged pt to try eating several small meals/snacks throughout the day and utilize Gatorade/Pedialyte to help maintain hydration & electrolytes, IV fluids offered, pt states he would like to come Monday, discussed schedule with lead infusion RN, pt scheduled for IV hydration & repeat labs on Monday 11/20/23 @ 5030 - pt aware and agreeable to plan    Lab results will be reviewed, if results remain within study parameters pt will proceed to C1D1 step-2 intervention per [id number] PD Arm - (Cabozantinib 60 mg PO daily until further progression or intolerable toxicity)

## 2023-11-20 ENCOUNTER — CLINICAL DOCUMENTATION (OUTPATIENT)
Dept: INFUSION THERAPY | Age: 63
End: 2023-11-20

## 2023-11-20 ENCOUNTER — HOSPITAL ENCOUNTER (OUTPATIENT)
Dept: INFUSION THERAPY | Age: 63
Discharge: HOME | End: 2023-11-20
Payer: COMMERCIAL

## 2023-11-20 VITALS
HEART RATE: 72 BPM | DIASTOLIC BLOOD PRESSURE: 62 MMHG | SYSTOLIC BLOOD PRESSURE: 99 MMHG | WEIGHT: 140.4 LBS | HEIGHT: 72 IN | TEMPERATURE: 98.3 F | BODY MASS INDEX: 19.02 KG/M2

## 2023-11-20 DIAGNOSIS — N17.9 AKI (ACUTE KIDNEY INJURY) (HCC): ICD-10-CM

## 2023-11-20 DIAGNOSIS — D63.8 ANEMIA OF CHRONIC DISEASE: ICD-10-CM

## 2023-11-20 DIAGNOSIS — R53.83 OTHER FATIGUE: ICD-10-CM

## 2023-11-20 DIAGNOSIS — C64.2 RENAL CELL CARCINOMA OF LEFT KIDNEY METASTATIC TO OTHER SITE (HCC): ICD-10-CM

## 2023-11-20 DIAGNOSIS — C64.2 RENAL CELL CARCINOMA OF LEFT KIDNEY METASTATIC TO OTHER SITE (HCC): Primary | ICD-10-CM

## 2023-11-20 DIAGNOSIS — E86.0 DEHYDRATION: Primary | ICD-10-CM

## 2023-11-20 DIAGNOSIS — E44.0 MODERATE MALNUTRITION (HCC): ICD-10-CM

## 2023-11-20 LAB
ALBUMIN SERPL-MCNC: 3.5 GM/DL (ref 3.4–5)
ALP BLD-CCNC: 121 IU/L (ref 40–128)
ALT SERPL-CCNC: 46 U/L (ref 10–40)
ANION GAP SERPL CALCULATED.3IONS-SCNC: 13 MMOL/L (ref 4–16)
AST SERPL-CCNC: 47 IU/L (ref 15–37)
BACTERIA: NEGATIVE /HPF
BASOPHILS ABSOLUTE: 0 K/CU MM
BASOPHILS RELATIVE PERCENT: 0.6 % (ref 0–1)
BILIRUB SERPL-MCNC: 0.3 MG/DL (ref 0–1)
BILIRUBIN URINE: NEGATIVE MG/DL
BLOOD, URINE: ABNORMAL
BUN SERPL-MCNC: 26 MG/DL (ref 6–23)
CALCIUM SERPL-MCNC: 10.1 MG/DL (ref 8.3–10.6)
CHLORIDE BLD-SCNC: 95 MMOL/L (ref 99–110)
CLARITY: CLEAR
CO2: 25 MMOL/L (ref 21–32)
COLOR: YELLOW
CREAT SERPL-MCNC: 1.1 MG/DL (ref 0.9–1.3)
DIFFERENTIAL TYPE: ABNORMAL
EOSINOPHILS ABSOLUTE: 0.5 K/CU MM
EOSINOPHILS RELATIVE PERCENT: 8.1 % (ref 0–3)
GFR SERPL CREATININE-BSD FRML MDRD: >60 ML/MIN/1.73M2
GLUCOSE SERPL-MCNC: 96 MG/DL (ref 70–99)
GLUCOSE, URINE: NEGATIVE MG/DL
GRANULAR CASTS: 3 /LPF
HCT VFR BLD CALC: 33 % (ref 42–52)
HEMOGLOBIN: 10.4 GM/DL (ref 13.5–18)
HYALINE CASTS: 5 /LPF
KETONES, URINE: NEGATIVE MG/DL
LEUKOCYTE ESTERASE, URINE: NEGATIVE
LYMPHOCYTES ABSOLUTE: 2.2 K/CU MM
LYMPHOCYTES RELATIVE PERCENT: 32.7 % (ref 24–44)
MCH RBC QN AUTO: 28.3 PG (ref 27–31)
MCHC RBC AUTO-ENTMCNC: 31.5 % (ref 32–36)
MCV RBC AUTO: 89.7 FL (ref 78–100)
MONOCYTES ABSOLUTE: 0.6 K/CU MM
MONOCYTES RELATIVE PERCENT: 9.2 % (ref 0–4)
MUCUS: ABNORMAL HPF
NITRITE URINE, QUANTITATIVE: NEGATIVE
PDW BLD-RTO: 13.5 % (ref 11.7–14.9)
PH, URINE: 5.5 (ref 5–8)
PLATELET # BLD: 393 K/CU MM (ref 140–440)
PMV BLD AUTO: 9.3 FL (ref 7.5–11.1)
POTASSIUM SERPL-SCNC: 4.9 MMOL/L (ref 3.5–5.1)
PROTEIN UA: ABNORMAL MG/DL
RBC # BLD: 3.68 M/CU MM (ref 4.6–6.2)
RBC URINE: 1 /HPF (ref 0–3)
SEGMENTED NEUTROPHILS ABSOLUTE COUNT: 3.3 K/CU MM
SEGMENTED NEUTROPHILS RELATIVE PERCENT: 49.4 % (ref 36–66)
SODIUM BLD-SCNC: 133 MMOL/L (ref 135–145)
SPECIFIC GRAVITY UA: 1.02 (ref 1–1.03)
SQUAMOUS EPITHELIAL: <1 /HPF
TOTAL PROTEIN: 7.4 GM/DL (ref 6.4–8.2)
TRICHOMONAS: ABNORMAL /HPF
TSH SERPL DL<=0.005 MIU/L-ACNC: 0.16 UIU/ML (ref 0.27–4.2)
UROBILINOGEN, URINE: 0.2 MG/DL (ref 0.2–1)
WBC # BLD: 6.6 K/CU MM (ref 4–10.5)
WBC UA: <1 /HPF (ref 0–2)

## 2023-11-20 PROCEDURE — 84443 ASSAY THYROID STIM HORMONE: CPT

## 2023-11-20 PROCEDURE — 80053 COMPREHEN METABOLIC PANEL: CPT

## 2023-11-20 PROCEDURE — 2580000003 HC RX 258: Performed by: INTERNAL MEDICINE

## 2023-11-20 PROCEDURE — 96360 HYDRATION IV INFUSION INIT: CPT

## 2023-11-20 PROCEDURE — 81001 URINALYSIS AUTO W/SCOPE: CPT

## 2023-11-20 PROCEDURE — 85025 COMPLETE CBC W/AUTO DIFF WBC: CPT

## 2023-11-20 RX ORDER — 0.9 % SODIUM CHLORIDE 0.9 %
1000 INTRAVENOUS SOLUTION INTRAVENOUS ONCE
Status: COMPLETED | OUTPATIENT
Start: 2023-11-20 | End: 2023-11-20

## 2023-11-20 RX ORDER — SODIUM CHLORIDE 9 MG/ML
5-250 INJECTION, SOLUTION INTRAVENOUS PRN
Status: CANCELLED | OUTPATIENT
Start: 2023-11-20

## 2023-11-20 RX ORDER — 0.9 % SODIUM CHLORIDE 0.9 %
1000 INTRAVENOUS SOLUTION INTRAVENOUS ONCE
Status: CANCELLED | OUTPATIENT
Start: 2023-11-20 | End: 2023-11-20

## 2023-11-20 RX ORDER — ZINC OXIDE 216 MG/ML
8 LOTION TOPICAL 3 TIMES DAILY
Qty: 90 EACH | Refills: 11 | Status: SHIPPED | OUTPATIENT
Start: 2023-11-20

## 2023-11-20 RX ORDER — HEPARIN 100 UNIT/ML
500 SYRINGE INTRAVENOUS PRN
Status: CANCELLED | OUTPATIENT
Start: 2023-11-20

## 2023-11-20 RX ORDER — SODIUM CHLORIDE 0.9 % (FLUSH) 0.9 %
5-40 SYRINGE (ML) INJECTION PRN
Status: CANCELLED | OUTPATIENT
Start: 2023-11-20

## 2023-11-20 RX ADMIN — SODIUM CHLORIDE 1000 ML: 9 INJECTION, SOLUTION INTRAVENOUS at 08:29

## 2023-11-20 NOTE — PROGRESS NOTES
Pt here for IVF hydration. PIV placed with blood return noted. Labs drawn and sent. UA sent as well. Pt states he is doing well. NS 1L given and completed. Pt tolerated without incident. I spoke with Edward Low RN to verify if pt needed a f/u appointment with Dr Leidy Goodman per Edward Low she will take care of all appointments.  Pt left ambulatory discharge instructions given

## 2023-11-20 NOTE — PROGRESS NOTES
[place] [id number]  PD Inhibitor (nivolumab) and Ipilimumab Followed by Nivolumab vs. VEGF TKI Cabozantinib with Nivolumab: A Phase III Trial in Metastatic Untreated Renal Cell Cancer     Patient Initials & ID #: S,P #[id number]  Eligibility Confirmed: Friday 08/18/23  Study Registration: Monday 08/21/23  Last Cycle Induction Tx: Tuesday 10/10/23 (C3D1)  Progression on Induction Tx: Tuesday 10/24/23 (CT CAP @ [address] [place] [address])  Registration ARM D (cabozantinib)Tx: Monday 11/20/23  Study Medication Provided for C1:  Monday 11/20/23  (1) Bottle containing (30) 60 mg cabozantinib tablets   : Laura Hays  LOT #: [id number]    Pt RTC today 11/20/23 to receive IV hydration & labs for evaluation prior to C1D1 cabozantinib per ARM D of [id number], study medication distributed directly to pt with education and instruction on administration, medication sheet inclusive of common/serious AEs and remedy suggestions provided, medication storage/handling/disposal discussed, refrigerator magnet provided listing emergency contact numbers, pt verbalized understanding and confirmed education with teach-back.     Lab results reviewed & remain within study parameters, abnormal values evaluated per CTCAE v5 as reported below, pt will proceed to C1D1 step-2 intervention per [id number] PD Arm - (Cabozantinib 60 mg PO daily until further progression or intolerable toxicity), (1) Cycle = 28-days, planned start date Tuesday 11/21/23, repeat labs due 12/18/23, pt planned RTC 12/19/23 for evaluation prior to initiation of C2    ALT:  46 (10 - 40) ----- G1 (>ULN - 3.0 x ULN if baseline was normal)  AST:  47  (15 -37) ----- G1 (>ULN - 3.0 x ULN if baseline was normal)  Anemia: 10.4 (13.5 - 18.0) ----- G1 (Hgb <LLN - 10.0 g/dL)  Hyponatremia:  133 (135 -145) ----- G1 (<LLN - 130 mmol/L  Hypothyroidism:  0.158 (0.270 - 4.20)  ----- G1 (asymptomatic; clinical or Dx observations only)    Study labs due for C1D1 s/p registration to step #2, pt opted to withdrawal

## 2023-11-22 ENCOUNTER — CLINICAL DOCUMENTATION (OUTPATIENT)
Dept: INFUSION THERAPY | Age: 63
End: 2023-11-22

## 2023-11-22 DIAGNOSIS — E86.0 DEHYDRATION: ICD-10-CM

## 2023-11-22 DIAGNOSIS — C78.00 MALIGNANT NEOPLASM OF KIDNEY METASTATIC TO LUNG (HCC): Primary | ICD-10-CM

## 2023-11-22 DIAGNOSIS — C64.9 MALIGNANT NEOPLASM OF KIDNEY METASTATIC TO LUNG (HCC): Primary | ICD-10-CM

## 2023-11-22 DIAGNOSIS — D63.8 ANEMIA OF CHRONIC DISEASE: ICD-10-CM

## 2023-11-22 NOTE — PROGRESS NOTES
[place] [id number]  PD Inhibitor (nivolumab) and Ipilimumab Followed by Nivolumab vs. VEGF TKI Cabozantinib with Nivolumab:  A Phase III Trial in Metastatic Untreated Renal Cell Cancer     Patient Initials & ID #: S,P #[id number]  Eligibility Confirmed: Friday 08/18/23  Study Registration: Monday 08/21/23  Last Cycle Induction Tx: Tuesday 10/10/23 (C3D1)  Progression on Induction Tx: Tuesday 10/24/23 (CT CAP @ [address] [place] [address])  Registration ARM D (cabozantinib)Tx: Monday 11/20/23  Study Medication Provided for C1:  Monday 11/20/23  (1) Bottle containing (30) 60 mg cabozantinib tablets   : Paolo Young  LOT #: [id number]    Wednesday 11/22/23: spoke with pt this morning @ 1141, states he took 1st dose of study medication (C1D1) Monday night (11/20/23) @ approximately 10pm, reports he fell asleep and forgot to take 11/21/23 dose - when he remembered it was less than 12 hours until next scheduled dose so forgotten dose was skipped, states he will take next dose today 11/22/23 @ 10pm, this nurse confirmed he was correct to omit missed dose due to time frame and resume medication as planned, reports he experienced an episode of diarrhea today, he took anti-diarrheal medication as prescribed with resolution, states he doesn't always feel like eating and drinking but he is making himself to so, denies N&V, encouraged oral intake and the use of nutritional supplements, confirmed order placed with [facility] for [facility] and they should have reached out to schedule delivery, pt states he has not heard from [place] but will check with his daughter when she arrives home, reminded pt due to the [address] [facility] [address] our office will be closed 11/23/23 & 11/24/23 - confirmed pt had on-call/emergency numbers on hand and discussed usage - pt stated understanding, declined additional needs at this time    Pt will RTC 12/06/23 @ 2:30pm for C1D15 evaluation & CBCD/CMP/TSH/Urinalysis per [id number] schedule, pt aware and confirmed  appointment, informed pt this nurse

## 2023-11-27 ENCOUNTER — CLINICAL DOCUMENTATION (OUTPATIENT)
Dept: INFUSION THERAPY | Age: 63
End: 2023-11-27

## 2023-11-27 DIAGNOSIS — C64.2 RENAL CELL CARCINOMA OF LEFT KIDNEY METASTATIC TO OTHER SITE (HCC): ICD-10-CM

## 2023-11-27 DIAGNOSIS — D63.8 ANEMIA OF CHRONIC DISEASE: ICD-10-CM

## 2023-11-27 DIAGNOSIS — N17.9 AKI (ACUTE KIDNEY INJURY) (HCC): Primary | ICD-10-CM

## 2023-11-27 DIAGNOSIS — R53.83 OTHER FATIGUE: ICD-10-CM

## 2023-11-27 DIAGNOSIS — E86.0 DEHYDRATION: ICD-10-CM

## 2023-11-27 RX ORDER — 0.9 % SODIUM CHLORIDE 0.9 %
1000 INTRAVENOUS SOLUTION INTRAVENOUS ONCE
Status: CANCELLED | OUTPATIENT
Start: 2023-11-27 | End: 2023-11-27

## 2023-11-27 RX ORDER — SODIUM CHLORIDE 0.9 % (FLUSH) 0.9 %
5-40 SYRINGE (ML) INJECTION PRN
Status: CANCELLED | OUTPATIENT
Start: 2023-11-27

## 2023-11-27 RX ORDER — HEPARIN SODIUM (PORCINE) LOCK FLUSH IV SOLN 100 UNIT/ML 100 UNIT/ML
500 SOLUTION INTRAVENOUS PRN
Status: CANCELLED | OUTPATIENT
Start: 2023-11-27

## 2023-11-27 RX ORDER — SODIUM CHLORIDE 9 MG/ML
5-250 INJECTION, SOLUTION INTRAVENOUS PRN
Status: CANCELLED | OUTPATIENT
Start: 2023-11-27

## 2023-11-27 NOTE — PROGRESS NOTES
Received message from pt's daughter, returned call and was able to speak with her, she shared concerns regarding pt's health status, expressed concern that pt may be under-reporting/estimating some GI symptoms, she reports oral intake is very low, this nurse offered support concerning daughters feelings/concerns, confirmed I would follow-up with pt this morning, encouraged her to call office with any additional questions or concerns    Contacted pt @ provided number ([phone]) to follow up on pt status, pt states he is taking his study medication as prescribed, states he is able to eat and drink but his intake remains low - reports at times he must \"force\" himself to eat/drink, he confirms drinking (1) Boost daily and tries to eat as tolerated, reports a single episode of diarrhea over the holiday weekend & considers bowel/bladder functioning WNL, pt feels his weight is stable, he reports some fatigue w/ increased need to rest, pt status discussed w/ infusion lead RN, OK' d to place on infusion schedule @ pt time preference, pt states he is unable to make an appointment today, appointment placed for 11/28/23 @ 7230 - CBCD/CMP to be collected, pt agreeable to plan    Reinforced if pt experiences any new or changing/worsening onset of SOB, dyspnea, changes in mental status (etc) he should report to the ED for evaluation, pt verbalized understanding

## 2023-11-28 ENCOUNTER — HOSPITAL ENCOUNTER (OUTPATIENT)
Dept: INFUSION THERAPY | Age: 63
Discharge: HOME | End: 2023-11-28
Payer: COMMERCIAL

## 2023-11-28 VITALS
OXYGEN SATURATION: 95 % | TEMPERATURE: 97.6 F | WEIGHT: 135 LBS | DIASTOLIC BLOOD PRESSURE: 72 MMHG | BODY MASS INDEX: 18.31 KG/M2 | SYSTOLIC BLOOD PRESSURE: 110 MMHG | HEART RATE: 93 BPM

## 2023-11-28 DIAGNOSIS — E86.0 DEHYDRATION: Primary | ICD-10-CM

## 2023-11-28 DIAGNOSIS — D63.8 ANEMIA OF CHRONIC DISEASE: ICD-10-CM

## 2023-11-28 DIAGNOSIS — C64.2 RENAL CELL CARCINOMA OF LEFT KIDNEY METASTATIC TO OTHER SITE (HCC): ICD-10-CM

## 2023-11-28 DIAGNOSIS — N17.9 AKI (ACUTE KIDNEY INJURY) (HCC): ICD-10-CM

## 2023-11-28 DIAGNOSIS — R53.83 OTHER FATIGUE: ICD-10-CM

## 2023-11-28 LAB
ALBUMIN SERPL-MCNC: 2.9 GM/DL (ref 3.4–5)
ALP BLD-CCNC: 118 IU/L (ref 40–128)
ALT SERPL-CCNC: 95 U/L (ref 10–40)
ANION GAP SERPL CALCULATED.3IONS-SCNC: 11 MMOL/L (ref 4–16)
AST SERPL-CCNC: 123 IU/L (ref 15–37)
BASOPHILS ABSOLUTE: 0 K/CU MM
BASOPHILS RELATIVE PERCENT: 0.3 % (ref 0–1)
BILIRUB SERPL-MCNC: 0.2 MG/DL (ref 0–1)
BUN SERPL-MCNC: 22 MG/DL (ref 6–23)
CALCIUM SERPL-MCNC: 7.9 MG/DL (ref 8.3–10.6)
CHLORIDE BLD-SCNC: 100 MMOL/L (ref 99–110)
CO2: 22 MMOL/L (ref 21–32)
CREAT SERPL-MCNC: 0.9 MG/DL (ref 0.9–1.3)
DIFFERENTIAL TYPE: ABNORMAL
EOSINOPHILS ABSOLUTE: 0.5 K/CU MM
EOSINOPHILS RELATIVE PERCENT: 9.1 % (ref 0–3)
GFR SERPL CREATININE-BSD FRML MDRD: >60 ML/MIN/1.73M2
GLUCOSE SERPL-MCNC: 86 MG/DL (ref 70–99)
HCT VFR BLD CALC: 38.2 % (ref 42–52)
HEMOGLOBIN: 12.2 GM/DL (ref 13.5–18)
LYMPHOCYTES ABSOLUTE: 2.4 K/CU MM
LYMPHOCYTES RELATIVE PERCENT: 40.9 % (ref 24–44)
MCH RBC QN AUTO: 28.1 PG (ref 27–31)
MCHC RBC AUTO-ENTMCNC: 31.9 % (ref 32–36)
MCV RBC AUTO: 88 FL (ref 78–100)
MONOCYTES ABSOLUTE: 0.5 K/CU MM
MONOCYTES RELATIVE PERCENT: 8.7 % (ref 0–4)
PDW BLD-RTO: 13.7 % (ref 11.7–14.9)
PLATELET # BLD: 319 K/CU MM (ref 140–440)
PMV BLD AUTO: 9.6 FL (ref 7.5–11.1)
POTASSIUM SERPL-SCNC: 5.2 MMOL/L (ref 3.5–5.1)
RBC # BLD: 4.34 M/CU MM (ref 4.6–6.2)
REASON FOR REJECTION: NORMAL
REJECTED TEST: NORMAL
SEGMENTED NEUTROPHILS ABSOLUTE COUNT: 2.4 K/CU MM
SEGMENTED NEUTROPHILS RELATIVE PERCENT: 41 % (ref 36–66)
SODIUM BLD-SCNC: 133 MMOL/L (ref 135–145)
TOTAL PROTEIN: 6.2 GM/DL (ref 6.4–8.2)
WBC # BLD: 6 K/CU MM (ref 4–10.5)

## 2023-11-28 PROCEDURE — 36415 COLL VENOUS BLD VENIPUNCTURE: CPT

## 2023-11-28 PROCEDURE — 85025 COMPLETE CBC W/AUTO DIFF WBC: CPT

## 2023-11-28 PROCEDURE — 2580000003 HC RX 258: Performed by: INTERNAL MEDICINE

## 2023-11-28 PROCEDURE — 96360 HYDRATION IV INFUSION INIT: CPT

## 2023-11-28 PROCEDURE — 80053 COMPREHEN METABOLIC PANEL: CPT

## 2023-11-28 RX ORDER — 0.9 % SODIUM CHLORIDE 0.9 %
1000 INTRAVENOUS SOLUTION INTRAVENOUS ONCE
Status: CANCELLED | OUTPATIENT
Start: 2023-11-28 | End: 2023-11-28

## 2023-11-28 RX ORDER — SODIUM CHLORIDE 0.9 % (FLUSH) 0.9 %
5-40 SYRINGE (ML) INJECTION PRN
Status: CANCELLED | OUTPATIENT
Start: 2023-11-28

## 2023-11-28 RX ORDER — 0.9 % SODIUM CHLORIDE 0.9 %
1000 INTRAVENOUS SOLUTION INTRAVENOUS ONCE
Status: COMPLETED | OUTPATIENT
Start: 2023-11-28 | End: 2023-11-28

## 2023-11-28 RX ORDER — SODIUM CHLORIDE 9 MG/ML
5-250 INJECTION, SOLUTION INTRAVENOUS PRN
Status: CANCELLED | OUTPATIENT
Start: 2023-11-28

## 2023-11-28 RX ORDER — HEPARIN 100 UNIT/ML
500 SYRINGE INTRAVENOUS PRN
Status: CANCELLED | OUTPATIENT
Start: 2023-11-28

## 2023-11-28 RX ORDER — SODIUM CHLORIDE 0.9 % (FLUSH) 0.9 %
5-40 SYRINGE (ML) INJECTION PRN
Status: DISCONTINUED | OUTPATIENT
Start: 2023-11-28 | End: 2023-11-29 | Stop reason: HOSPADM

## 2023-11-28 RX ADMIN — SODIUM CHLORIDE 1000 ML: 9 INJECTION, SOLUTION INTRAVENOUS at 09:09

## 2023-11-28 ASSESSMENT — PAIN SCALES - GENERAL: PAINLEVEL_OUTOF10: 0

## 2023-11-28 NOTE — PROGRESS NOTES
Assisted to infusion area, here today for hydration. Pt mentioned have some diarrhea, but was better now  . PIV placed in left hand, positive blood return noted. Labs drawn. Labs within defined limits. 0.9% NS 1L administered as ordered. Call light within reach. Tolerated infusion without incident. Nurse Navigator spoke with pt.

## 2023-11-30 DIAGNOSIS — R91.1 LEFT LOWER LOBE PULMONARY NODULE: ICD-10-CM

## 2023-11-30 DIAGNOSIS — C64.2 RENAL CELL CARCINOMA OF LEFT KIDNEY METASTATIC TO OTHER SITE (HCC): ICD-10-CM

## 2023-11-30 DIAGNOSIS — C78.00 MALIGNANT NEOPLASM OF KIDNEY METASTATIC TO LUNG (HCC): Primary | ICD-10-CM

## 2023-11-30 DIAGNOSIS — C64.9 PRIMARY MALIGNANT NEOPLASM OF KIDNEY WITH METASTASIS FROM KIDNEY TO OTHER SITE, UNSPECIFIED LATERALITY (HCC): Primary | ICD-10-CM

## 2023-11-30 DIAGNOSIS — C64.9 MALIGNANT NEOPLASM OF KIDNEY METASTATIC TO LUNG (HCC): Primary | ICD-10-CM

## 2023-11-30 DIAGNOSIS — R52 PAIN: Primary | ICD-10-CM

## 2023-11-30 RX ORDER — TRAMADOL HYDROCHLORIDE 50 MG/1
50 TABLET ORAL EVERY 8 HOURS PRN
Qty: 90 TABLET | Refills: 0 | Status: SHIPPED | OUTPATIENT
Start: 2023-11-30 | End: 2023-12-30

## 2023-11-30 RX ORDER — TRAMADOL HYDROCHLORIDE 50 MG/1
50 TABLET ORAL EVERY 8 HOURS PRN
Qty: 90 TABLET | Refills: 0 | Status: SHIPPED | OUTPATIENT
Start: 2023-11-30 | End: 2023-11-30

## 2023-12-01 DIAGNOSIS — C64.9 MALIGNANT NEOPLASM OF KIDNEY METASTATIC TO LUNG (HCC): ICD-10-CM

## 2023-12-01 DIAGNOSIS — M79.10 MUSCLE PAIN: Primary | ICD-10-CM

## 2023-12-01 DIAGNOSIS — C78.00 MALIGNANT NEOPLASM OF KIDNEY METASTATIC TO LUNG (HCC): ICD-10-CM

## 2023-12-01 RX ORDER — METHOCARBAMOL 500 MG/1
500 TABLET, FILM COATED ORAL 2 TIMES DAILY
Qty: 14 TABLET | Refills: 0 | Status: SHIPPED | OUTPATIENT
Start: 2023-12-01 | End: 2023-12-08

## 2023-12-04 ENCOUNTER — CLINICAL DOCUMENTATION (OUTPATIENT)
Dept: INFUSION THERAPY | Age: 63
End: 2023-12-04

## 2023-12-04 ENCOUNTER — CLINICAL DOCUMENTATION (OUTPATIENT)
Facility: HOSPITAL | Age: 63
End: 2023-12-04

## 2023-12-04 DIAGNOSIS — C78.00 MALIGNANT NEOPLASM OF KIDNEY METASTATIC TO LUNG (HCC): ICD-10-CM

## 2023-12-04 DIAGNOSIS — C64.9 MALIGNANT NEOPLASM OF KIDNEY METASTATIC TO LUNG (HCC): ICD-10-CM

## 2023-12-04 DIAGNOSIS — R63.0 DECREASED APPETITE: Primary | ICD-10-CM

## 2023-12-04 RX ORDER — MIRTAZAPINE 15 MG/1
15 TABLET, FILM COATED ORAL NIGHTLY
Qty: 30 TABLET | Refills: 0 | Status: SHIPPED | OUTPATIENT
Start: 2023-12-04 | End: 2023-12-10

## 2023-12-04 NOTE — PROGRESS NOTES
Left a message for Fermin Bumpers daughter  to return my call regarding questions forMedicare enrollment    [facility]  [employer]  [phone]

## 2023-12-04 NOTE — PROGRESS NOTES
Received message this morning from daughter expressing concerns r/t her fathers health status, reports over the weekend pt had episodes of confusion - reporting activity that had not actually happened, PO intake remains very low - she has to strongly encourage intake, and reports concern for self-administration of medications    This nurse contacted pt @ provided # [phone] to evaluate status and discuss upcoming appointments, pt states he's doing \"ok\", confirms continued difficulty w/ appetite & hydration - having to \"force\" PO intake, at the time of phone call pt AO x 3, at times pt speech was low and difficult to understand, pt reported increased tiredness \"pretty much\" relieved by rest, pt denied further symptoms    Physician updated to pt status, IV hydration offered, pt placed on INFUSION schedule for Tuesday 12/05/23 @ 2:30pm w/ CBCD/CMP/TSH/Urinalysis to be completed same-day, to help increase appetite - prescription for Remeron 15MG tabs e-scribed to [address] w/ direction to take (1) 15MG tablet PO q.h.s    Pt confirmed upcoming appointments, is agreeable to plan, pt's daughter also aware and planning to bring pt to upcoming appointments, this nurse requested pt bring current medications to [address] [place] [address] for reconciliation/administration review

## 2023-12-05 ENCOUNTER — CLINICAL DOCUMENTATION (OUTPATIENT)
Dept: INFUSION THERAPY | Age: 63
End: 2023-12-05

## 2023-12-05 ENCOUNTER — HOSPITAL ENCOUNTER (OUTPATIENT)
Dept: INFUSION THERAPY | Age: 63
Discharge: HOME | End: 2023-12-05
Payer: COMMERCIAL

## 2023-12-05 ENCOUNTER — CLINICAL DOCUMENTATION (OUTPATIENT)
Dept: ONCOLOGY | Age: 63
End: 2023-12-05

## 2023-12-05 DIAGNOSIS — R41.0 CONFUSION: ICD-10-CM

## 2023-12-05 DIAGNOSIS — C78.00 MALIGNANT NEOPLASM OF KIDNEY METASTATIC TO LUNG (HCC): ICD-10-CM

## 2023-12-05 DIAGNOSIS — C64.9 MALIGNANT NEOPLASM OF KIDNEY METASTATIC TO LUNG (HCC): ICD-10-CM

## 2023-12-05 DIAGNOSIS — C64.2 RENAL CELL CARCINOMA OF LEFT KIDNEY METASTATIC TO OTHER SITE (HCC): ICD-10-CM

## 2023-12-05 DIAGNOSIS — R53.83 OTHER FATIGUE: Primary | ICD-10-CM

## 2023-12-05 DIAGNOSIS — C64.2 RENAL CELL CARCINOMA OF LEFT KIDNEY METASTATIC TO OTHER SITE (HCC): Primary | ICD-10-CM

## 2023-12-05 DIAGNOSIS — R41.0 ACUTE CONFUSION: ICD-10-CM

## 2023-12-05 DIAGNOSIS — R41.0 CONFUSION AND DISORIENTATION: ICD-10-CM

## 2023-12-05 DIAGNOSIS — D63.8 ANEMIA OF CHRONIC DISEASE: ICD-10-CM

## 2023-12-05 DIAGNOSIS — N17.9 AKI (ACUTE KIDNEY INJURY) (HCC): ICD-10-CM

## 2023-12-05 DIAGNOSIS — E86.0 DEHYDRATION: ICD-10-CM

## 2023-12-05 LAB
ALBUMIN SERPL-MCNC: 2.9 GM/DL (ref 3.4–5)
ALP BLD-CCNC: 158 IU/L (ref 40–129)
ALT SERPL-CCNC: 135 U/L (ref 10–40)
ANION GAP SERPL CALCULATED.3IONS-SCNC: 12 MMOL/L (ref 4–16)
AST SERPL-CCNC: 162 IU/L (ref 15–37)
BASOPHILS ABSOLUTE: 0.1 K/CU MM
BASOPHILS RELATIVE PERCENT: 0.9 % (ref 0–1)
BILIRUB SERPL-MCNC: 0.3 MG/DL (ref 0–1)
BUN SERPL-MCNC: 31 MG/DL (ref 6–23)
CALCIUM SERPL-MCNC: 8.2 MG/DL (ref 8.3–10.6)
CHLORIDE BLD-SCNC: 102 MMOL/L (ref 99–110)
CO2: 23 MMOL/L (ref 21–32)
CREAT SERPL-MCNC: 1.9 MG/DL (ref 0.9–1.3)
DIFFERENTIAL TYPE: ABNORMAL
EOSINOPHILS ABSOLUTE: 0.3 K/CU MM
EOSINOPHILS RELATIVE PERCENT: 5.7 % (ref 0–3)
GFR SERPL CREATININE-BSD FRML MDRD: 39 ML/MIN/1.73M2
GLUCOSE SERPL-MCNC: 81 MG/DL (ref 70–99)
HCT VFR BLD CALC: 38.6 % (ref 42–52)
HEMOGLOBIN: 12.2 GM/DL (ref 13.5–18)
LYMPHOCYTES ABSOLUTE: 1.7 K/CU MM
LYMPHOCYTES RELATIVE PERCENT: 30.3 % (ref 24–44)
MCH RBC QN AUTO: 27.9 PG (ref 27–31)
MCHC RBC AUTO-ENTMCNC: 31.6 % (ref 32–36)
MCV RBC AUTO: 88.1 FL (ref 78–100)
MONOCYTES ABSOLUTE: 0.4 K/CU MM
MONOCYTES RELATIVE PERCENT: 6.8 % (ref 0–4)
PDW BLD-RTO: 14.8 % (ref 11.7–14.9)
PLATELET # BLD: 206 K/CU MM (ref 140–440)
PMV BLD AUTO: 9.1 FL (ref 7.5–11.1)
POTASSIUM SERPL-SCNC: 5.2 MMOL/L (ref 3.5–5.1)
RBC # BLD: 4.38 M/CU MM (ref 4.6–6.2)
SEGMENTED NEUTROPHILS ABSOLUTE COUNT: 3.1 K/CU MM
SEGMENTED NEUTROPHILS RELATIVE PERCENT: 56.3 % (ref 36–66)
SODIUM BLD-SCNC: 137 MMOL/L (ref 135–145)
TOTAL PROTEIN: 6.6 GM/DL (ref 6.4–8.2)
TSH SERPL DL<=0.005 MIU/L-ACNC: 0.03 UIU/ML (ref 0.27–4.2)
WBC # BLD: 5.5 K/CU MM (ref 4–10.5)

## 2023-12-05 PROCEDURE — 80053 COMPREHEN METABOLIC PANEL: CPT

## 2023-12-05 PROCEDURE — 2580000003 HC RX 258: Performed by: INTERNAL MEDICINE

## 2023-12-05 PROCEDURE — 85025 COMPLETE CBC W/AUTO DIFF WBC: CPT

## 2023-12-05 PROCEDURE — 84443 ASSAY THYROID STIM HORMONE: CPT

## 2023-12-05 RX ORDER — 0.9 % SODIUM CHLORIDE 0.9 %
1000 INTRAVENOUS SOLUTION INTRAVENOUS ONCE
Status: COMPLETED | OUTPATIENT
Start: 2023-12-05 | End: 2023-12-05

## 2023-12-05 RX ORDER — SODIUM CHLORIDE 0.9 % (FLUSH) 0.9 %
5-40 SYRINGE (ML) INJECTION PRN
Status: CANCELLED | OUTPATIENT
Start: 2023-12-05

## 2023-12-05 RX ORDER — SODIUM CHLORIDE 9 MG/ML
5-250 INJECTION, SOLUTION INTRAVENOUS PRN
Status: CANCELLED | OUTPATIENT
Start: 2023-12-05

## 2023-12-05 RX ORDER — HEPARIN 100 UNIT/ML
500 SYRINGE INTRAVENOUS PRN
Status: CANCELLED | OUTPATIENT
Start: 2023-12-05

## 2023-12-05 RX ORDER — 0.9 % SODIUM CHLORIDE 0.9 %
1000 INTRAVENOUS SOLUTION INTRAVENOUS ONCE
Status: CANCELLED
Start: 2023-12-05

## 2023-12-05 RX ADMIN — SODIUM CHLORIDE 1000 ML: 9 INJECTION, SOLUTION INTRAVENOUS at 14:56

## 2023-12-05 NOTE — PROGRESS NOTES
Patient arrived to treatment suite for IVF. PIV started in left arm and good blood return noted. Venipuncture performed on left AC for blood draw for labs. Patient is feeling week. Stefania Licona RN, to treatment suite to talk with patient. Treatment approved and given. Patient tolerated well. Left treatment with assistance in wheelchair.

## 2023-12-05 NOTE — PROGRESS NOTES
[place] [id number]  PD Inhibitor (nivolumab) and Ipilimumab Followed by Nivolumab vs. VEGF TKI Cabozantinib with Nivolumab:  A Phase III Trial in Metastatic Untreated Renal Cell Cancer     Patient Initials & ID #: S,P #[id number]  Eligibility Confirmed: Friday 08/18/23  Study Registration: Monday 08/21/23  Last Cycle Induction Tx: Tuesday 10/10/23 (C3D1)  Progression on Induction Tx: Tuesday 10/24/23 (CT CAP @ [address] [place] [address])  Registration ARM D (cabozantinib)Tx: Monday 11/20/23  Study Medication Provided for C1:  Monday 11/20/23  (1) Bottle containing (30) 60 mg cabozantinib tablets   : Liya Brown  LOT #: [id number]    Received message from daughter this morning stating continued concerns r/t to pt's health status & requesting return call    This nurse contacted daughter @ provided number [phone] to discuss concerns, daughter reported pt had multiple intermittent episodes of confusion yesterday and overnight, she states pt was resting in his recliner as usual and got a \"burst\" of energy later becoming upset w/ episodes of yelling, began asking his grandson what door of the house he had come in through so that he could find his way out to his car & leave, pt is a  and has started calling his daughter by his wife's name Liana Perez) and yells out for her in the night, a large amount of money was misplaced which pt was convinced had been stolen - stating it had been placed in a pill bottle - the money was later located in a folder with healthcare paperwork, daughter reports pt \"hallucinating\" telling her they were going to get robbed & that her uncle was in half-way - pt was very upset/angry with her (r/t uncle) saying she was \"going to let him rot there\" and they needed to get him out, daughter was able to contact the family member & pt calmed down after speaking with him, pt was c/o pain - took scheduled Tramadol dose and is now resting, daughter reports C1D15 cabozantinib dose skipped, pt has appointment today for IV

## 2023-12-06 ENCOUNTER — HOSPITAL ENCOUNTER (INPATIENT)
Age: 63
LOS: 3 days | End: 2023-12-09
Attending: EMERGENCY MEDICINE | Admitting: STUDENT IN AN ORGANIZED HEALTH CARE EDUCATION/TRAINING PROGRAM
Payer: COMMERCIAL

## 2023-12-06 ENCOUNTER — APPOINTMENT (OUTPATIENT)
Dept: MRI IMAGING | Age: 63
End: 2023-12-06
Payer: COMMERCIAL

## 2023-12-06 ENCOUNTER — APPOINTMENT (OUTPATIENT)
Dept: GENERAL RADIOLOGY | Age: 63
End: 2023-12-06
Payer: COMMERCIAL

## 2023-12-06 ENCOUNTER — APPOINTMENT (OUTPATIENT)
Dept: CT IMAGING | Age: 63
End: 2023-12-06
Payer: COMMERCIAL

## 2023-12-06 VITALS
WEIGHT: 131.6 LBS | HEART RATE: 97 BPM | RESPIRATION RATE: 21 BRPM | HEIGHT: 72 IN | TEMPERATURE: 98.5 F | DIASTOLIC BLOOD PRESSURE: 66 MMHG | OXYGEN SATURATION: 98 % | SYSTOLIC BLOOD PRESSURE: 159 MMHG | BODY MASS INDEX: 17.82 KG/M2

## 2023-12-06 DIAGNOSIS — R41.0 ACUTE DELIRIUM: Primary | ICD-10-CM

## 2023-12-06 DIAGNOSIS — N17.9 AKI (ACUTE KIDNEY INJURY) (HCC): ICD-10-CM

## 2023-12-06 PROBLEM — G93.40 ACUTE ENCEPHALOPATHY: Status: ACTIVE | Noted: 2023-12-06

## 2023-12-06 LAB
ALBUMIN SERPL-MCNC: 2.9 GM/DL (ref 3.4–5)
ALP BLD-CCNC: 152 IU/L (ref 40–129)
ALT SERPL-CCNC: 122 U/L (ref 10–40)
AMMONIA: 21 UMOL/L (ref 16–60)
ANION GAP SERPL CALCULATED.3IONS-SCNC: 12 MMOL/L (ref 4–16)
AST SERPL-CCNC: 142 IU/L (ref 15–37)
BACTERIA: NEGATIVE /HPF
BILIRUB SERPL-MCNC: 0.3 MG/DL (ref 0–1)
BILIRUBIN URINE: NEGATIVE MG/DL
BLOOD, URINE: NEGATIVE
BUN SERPL-MCNC: 35 MG/DL (ref 6–23)
CALCIUM SERPL-MCNC: 8.7 MG/DL (ref 8.3–10.6)
CHLORIDE BLD-SCNC: 103 MMOL/L (ref 99–110)
CHP ED QC CHECK: NORMAL
CLARITY: CLEAR
CO2: 23 MMOL/L (ref 21–32)
COLOR: YELLOW
CREAT SERPL-MCNC: 1.8 MG/DL (ref 0.9–1.3)
EKG ATRIAL RATE: 83 BPM
EKG DIAGNOSIS: NORMAL
EKG Q-T INTERVAL: 408 MS
EKG QRS DURATION: 86 MS
EKG QTC CALCULATION (BAZETT): 479 MS
EKG R AXIS: 18 DEGREES
EKG T AXIS: 65 DEGREES
EKG VENTRICULAR RATE: 83 BPM
GFR SERPL CREATININE-BSD FRML MDRD: 41 ML/MIN/1.73M2
GLUCOSE BLD-MCNC: 62 MG/DL
GLUCOSE BLD-MCNC: 62 MG/DL (ref 70–99)
GLUCOSE SERPL-MCNC: 83 MG/DL (ref 70–99)
GLUCOSE, URINE: NEGATIVE MG/DL
GRANULAR CASTS: 18 /LPF
HCT VFR BLD CALC: 37.9 % (ref 42–52)
HEMOGLOBIN: 11.7 GM/DL (ref 13.5–18)
HYALINE CASTS: 10 /LPF
KETONES, URINE: NEGATIVE MG/DL
LEUKOCYTE ESTERASE, URINE: NEGATIVE
MAGNESIUM: 2.2 MG/DL (ref 1.8–2.4)
MCH RBC QN AUTO: 27.6 PG (ref 27–31)
MCHC RBC AUTO-ENTMCNC: 30.9 % (ref 32–36)
MCV RBC AUTO: 89.4 FL (ref 78–100)
MUCUS: ABNORMAL HPF
NITRITE URINE, QUANTITATIVE: NEGATIVE
PDW BLD-RTO: 14.4 % (ref 11.7–14.9)
PH, URINE: 5.5 (ref 5–8)
PLATELET # BLD: 172 K/CU MM (ref 140–440)
PMV BLD AUTO: 9.1 FL (ref 7.5–11.1)
POTASSIUM SERPL-SCNC: 5.1 MMOL/L (ref 3.5–5.1)
PROTEIN UA: ABNORMAL MG/DL
RBC # BLD: 4.24 M/CU MM (ref 4.6–6.2)
RBC URINE: 0 /HPF (ref 0–3)
SODIUM BLD-SCNC: 138 MMOL/L (ref 135–145)
SPECIFIC GRAVITY UA: 1.02 (ref 1–1.03)
TOTAL PROTEIN: 7.4 GM/DL (ref 6.4–8.2)
TRICHOMONAS: ABNORMAL /HPF
TROPONIN, HIGH SENSITIVITY: 33 NG/L (ref 0–22)
TROPONIN, HIGH SENSITIVITY: 37 NG/L (ref 0–22)
UROBILINOGEN, URINE: 0.2 MG/DL (ref 0.2–1)
WBC # BLD: 4.9 K/CU MM (ref 4–10.5)
WBC UA: <1 /HPF (ref 0–2)

## 2023-12-06 PROCEDURE — 6360000004 HC RX CONTRAST MEDICATION: Performed by: STUDENT IN AN ORGANIZED HEALTH CARE EDUCATION/TRAINING PROGRAM

## 2023-12-06 PROCEDURE — 96361 HYDRATE IV INFUSION ADD-ON: CPT

## 2023-12-06 PROCEDURE — 93005 ELECTROCARDIOGRAM TRACING: CPT | Performed by: EMERGENCY MEDICINE

## 2023-12-06 PROCEDURE — 81001 URINALYSIS AUTO W/SCOPE: CPT

## 2023-12-06 PROCEDURE — A9579 GAD-BASE MR CONTRAST NOS,1ML: HCPCS | Performed by: STUDENT IN AN ORGANIZED HEALTH CARE EDUCATION/TRAINING PROGRAM

## 2023-12-06 PROCEDURE — 96360 HYDRATION IV INFUSION INIT: CPT

## 2023-12-06 PROCEDURE — 82140 ASSAY OF AMMONIA: CPT

## 2023-12-06 PROCEDURE — 99285 EMERGENCY DEPT VISIT HI MDM: CPT

## 2023-12-06 PROCEDURE — 85027 COMPLETE CBC AUTOMATED: CPT

## 2023-12-06 PROCEDURE — 6370000000 HC RX 637 (ALT 250 FOR IP): Performed by: STUDENT IN AN ORGANIZED HEALTH CARE EDUCATION/TRAINING PROGRAM

## 2023-12-06 PROCEDURE — 2580000003 HC RX 258: Performed by: STUDENT IN AN ORGANIZED HEALTH CARE EDUCATION/TRAINING PROGRAM

## 2023-12-06 PROCEDURE — 84484 ASSAY OF TROPONIN QUANT: CPT

## 2023-12-06 PROCEDURE — 93010 ELECTROCARDIOGRAM REPORT: CPT | Performed by: INTERNAL MEDICINE

## 2023-12-06 PROCEDURE — 1200000000 HC SEMI PRIVATE

## 2023-12-06 PROCEDURE — 2580000003 HC RX 258: Performed by: EMERGENCY MEDICINE

## 2023-12-06 PROCEDURE — 83735 ASSAY OF MAGNESIUM: CPT

## 2023-12-06 PROCEDURE — 70553 MRI BRAIN STEM W/O & W/DYE: CPT

## 2023-12-06 PROCEDURE — 70450 CT HEAD/BRAIN W/O DYE: CPT

## 2023-12-06 PROCEDURE — 80053 COMPREHEN METABOLIC PANEL: CPT

## 2023-12-06 PROCEDURE — 94761 N-INVAS EAR/PLS OXIMETRY MLT: CPT

## 2023-12-06 PROCEDURE — 6360000002 HC RX W HCPCS: Performed by: STUDENT IN AN ORGANIZED HEALTH CARE EDUCATION/TRAINING PROGRAM

## 2023-12-06 PROCEDURE — 82962 GLUCOSE BLOOD TEST: CPT

## 2023-12-06 PROCEDURE — 71045 X-RAY EXAM CHEST 1 VIEW: CPT

## 2023-12-06 PROCEDURE — 6370000000 HC RX 637 (ALT 250 FOR IP)

## 2023-12-06 PROCEDURE — 6370000000 HC RX 637 (ALT 250 FOR IP): Performed by: EMERGENCY MEDICINE

## 2023-12-06 RX ORDER — POTASSIUM CHLORIDE 20 MEQ/1
40 TABLET, EXTENDED RELEASE ORAL PRN
Status: DISCONTINUED | OUTPATIENT
Start: 2023-12-06 | End: 2023-12-09 | Stop reason: HOSPADM

## 2023-12-06 RX ORDER — 0.9 % SODIUM CHLORIDE 0.9 %
1000 INTRAVENOUS SOLUTION INTRAVENOUS ONCE
Status: COMPLETED | OUTPATIENT
Start: 2023-12-06 | End: 2023-12-06

## 2023-12-06 RX ORDER — ATORVASTATIN CALCIUM 10 MG/1
20 TABLET, FILM COATED ORAL DAILY
Status: DISCONTINUED | OUTPATIENT
Start: 2023-12-06 | End: 2023-12-09 | Stop reason: HOSPADM

## 2023-12-06 RX ORDER — ENOXAPARIN SODIUM 100 MG/ML
40 INJECTION SUBCUTANEOUS DAILY
Status: DISCONTINUED | OUTPATIENT
Start: 2023-12-06 | End: 2023-12-09 | Stop reason: HOSPADM

## 2023-12-06 RX ORDER — ONDANSETRON 4 MG/1
4 TABLET, ORALLY DISINTEGRATING ORAL EVERY 8 HOURS PRN
Status: DISCONTINUED | OUTPATIENT
Start: 2023-12-06 | End: 2023-12-09 | Stop reason: HOSPADM

## 2023-12-06 RX ORDER — ONDANSETRON 2 MG/ML
4 INJECTION INTRAMUSCULAR; INTRAVENOUS EVERY 6 HOURS PRN
Status: DISCONTINUED | OUTPATIENT
Start: 2023-12-06 | End: 2023-12-09 | Stop reason: HOSPADM

## 2023-12-06 RX ORDER — SODIUM CHLORIDE 9 MG/ML
INJECTION, SOLUTION INTRAVENOUS CONTINUOUS
Status: DISPENSED | OUTPATIENT
Start: 2023-12-06 | End: 2023-12-08

## 2023-12-06 RX ORDER — SODIUM CHLORIDE 0.9 % (FLUSH) 0.9 %
5-40 SYRINGE (ML) INJECTION PRN
Status: DISCONTINUED | OUTPATIENT
Start: 2023-12-06 | End: 2023-12-09 | Stop reason: HOSPADM

## 2023-12-06 RX ORDER — ACETAMINOPHEN 325 MG/1
650 TABLET ORAL EVERY 6 HOURS PRN
Status: DISCONTINUED | OUTPATIENT
Start: 2023-12-06 | End: 2023-12-09 | Stop reason: HOSPADM

## 2023-12-06 RX ORDER — PANTOPRAZOLE SODIUM 40 MG/1
40 TABLET, DELAYED RELEASE ORAL
Status: DISCONTINUED | OUTPATIENT
Start: 2023-12-07 | End: 2023-12-09 | Stop reason: HOSPADM

## 2023-12-06 RX ORDER — MIRTAZAPINE 15 MG/1
15 TABLET, FILM COATED ORAL NIGHTLY
Status: DISCONTINUED | OUTPATIENT
Start: 2023-12-06 | End: 2023-12-09 | Stop reason: HOSPADM

## 2023-12-06 RX ORDER — IPRATROPIUM BROMIDE AND ALBUTEROL SULFATE 2.5; .5 MG/3ML; MG/3ML
1 SOLUTION RESPIRATORY (INHALATION)
Status: DISCONTINUED | OUTPATIENT
Start: 2023-12-07 | End: 2023-12-07

## 2023-12-06 RX ORDER — SODIUM CHLORIDE 9 MG/ML
INJECTION, SOLUTION INTRAVENOUS PRN
Status: DISCONTINUED | OUTPATIENT
Start: 2023-12-06 | End: 2023-12-09 | Stop reason: HOSPADM

## 2023-12-06 RX ORDER — ACETAMINOPHEN 650 MG/1
650 SUPPOSITORY RECTAL EVERY 6 HOURS PRN
Status: DISCONTINUED | OUTPATIENT
Start: 2023-12-06 | End: 2023-12-09 | Stop reason: HOSPADM

## 2023-12-06 RX ORDER — HALOPERIDOL 5 MG/ML
2 INJECTION INTRAMUSCULAR EVERY 6 HOURS PRN
Status: DISCONTINUED | OUTPATIENT
Start: 2023-12-06 | End: 2023-12-09 | Stop reason: HOSPADM

## 2023-12-06 RX ORDER — SODIUM CHLORIDE 0.9 % (FLUSH) 0.9 %
5-40 SYRINGE (ML) INJECTION EVERY 12 HOURS SCHEDULED
Status: DISCONTINUED | OUTPATIENT
Start: 2023-12-06 | End: 2023-12-09 | Stop reason: HOSPADM

## 2023-12-06 RX ORDER — LORAZEPAM 0.5 MG/1
0.5 TABLET ORAL ONCE
Status: COMPLETED | OUTPATIENT
Start: 2023-12-06 | End: 2023-12-06

## 2023-12-06 RX ORDER — POTASSIUM CHLORIDE 7.45 MG/ML
10 INJECTION INTRAVENOUS PRN
Status: DISCONTINUED | OUTPATIENT
Start: 2023-12-06 | End: 2023-12-09 | Stop reason: HOSPADM

## 2023-12-06 RX ORDER — POLYETHYLENE GLYCOL 3350 17 G/17G
17 POWDER, FOR SOLUTION ORAL DAILY PRN
Status: DISCONTINUED | OUTPATIENT
Start: 2023-12-06 | End: 2023-12-09 | Stop reason: HOSPADM

## 2023-12-06 RX ORDER — MAGNESIUM SULFATE IN WATER 40 MG/ML
2000 INJECTION, SOLUTION INTRAVENOUS PRN
Status: DISCONTINUED | OUTPATIENT
Start: 2023-12-06 | End: 2023-12-09 | Stop reason: HOSPADM

## 2023-12-06 RX ADMIN — SODIUM CHLORIDE, PRESERVATIVE FREE 10 ML: 5 INJECTION INTRAVENOUS at 22:30

## 2023-12-06 RX ADMIN — SODIUM CHLORIDE 1000 ML: 9 INJECTION, SOLUTION INTRAVENOUS at 09:55

## 2023-12-06 RX ADMIN — SODIUM CHLORIDE: 9 INJECTION, SOLUTION INTRAVENOUS at 14:01

## 2023-12-06 RX ADMIN — HALOPERIDOL LACTATE 2 MG: 5 INJECTION, SOLUTION INTRAMUSCULAR at 23:32

## 2023-12-06 RX ADMIN — LORAZEPAM 0.5 MG: 0.5 TABLET ORAL at 10:01

## 2023-12-06 RX ADMIN — ATORVASTATIN CALCIUM 20 MG: 10 TABLET, FILM COATED ORAL at 22:30

## 2023-12-06 RX ADMIN — SODIUM CHLORIDE 1000 ML: 9 INJECTION, SOLUTION INTRAVENOUS at 11:56

## 2023-12-06 RX ADMIN — ACETAMINOPHEN 650 MG: 325 TABLET ORAL at 20:13

## 2023-12-06 RX ADMIN — MIRTAZAPINE 15 MG: 15 TABLET, FILM COATED ORAL at 22:30

## 2023-12-06 RX ADMIN — GADOTERIDOL 12 ML: 279.3 INJECTION, SOLUTION INTRAVENOUS at 15:58

## 2023-12-06 ASSESSMENT — PAIN SCALES - GENERAL
PAINLEVEL_OUTOF10: 5
PAINLEVEL_OUTOF10: 3

## 2023-12-06 ASSESSMENT — PAIN - FUNCTIONAL ASSESSMENT: PAIN_FUNCTIONAL_ASSESSMENT: 0-10

## 2023-12-06 ASSESSMENT — PAIN DESCRIPTION - DESCRIPTORS: DESCRIPTORS: ACHING

## 2023-12-06 ASSESSMENT — PAIN DESCRIPTION - LOCATION
LOCATION: NECK
LOCATION: ANKLE

## 2023-12-06 ASSESSMENT — PAIN DESCRIPTION - ORIENTATION: ORIENTATION: LEFT;RIGHT

## 2023-12-06 NOTE — ED PROVIDER NOTES
Emergency Department Encounter  Location: [facility] EMERGENCY DEPARTMENT    Patient: Julio C Rivero  MRN: [id number]  : 1960  Date of evaluation: 2023  ED Provider: Hellen Stephen DO    Chief Complaint:    Altered Mental Status    [place]:  Julio C Rivero is a 70 y.o. male that presents to the emergency department from home with his daughter with concern for altered mental status and confusion for the past 2 weeks. Patient has known renal cell CA metastatic to the lungs. Currently in treatment with Dr. Michelle Cruz. Typically receives IVF infusion at [facility] on Tuesday which seems to improve his condition temporarily. However, since Thursday of last week, has become verbally aggressive, up at night, wandering the house. Has been confusing his daughter with his wife (who is ). Patient is altered and unable to provide recent history. Past Medical History:   Diagnosis Date    History of kidney cancer     left     Past Surgical History:   Procedure Laterality Date    BACK SURGERY  2020    two disks replaced    KIDNEY REMOVAL Left     kidney cancer     Family History   Problem Relation Age of Onset    Heart Disease Mother     Heart Disease Father      Social History     Socioeconomic History    Marital status:       Spouse name: Not on file    Number of children: Not on file    Years of education: Not on file    Highest education level: Not on file   Occupational History    Not on file   Tobacco Use    Smoking status: Former     Packs/day: 0.50     Years: 50.00     Additional pack years: 0.00     Total pack years: 25.00     Types: Cigarettes     Start date: 80     Quit date: 2023     Years since quittin.6    Smokeless tobacco: Never   Vaping Use    Vaping Use: Never used   Substance and Sexual Activity    Alcohol use: Not Currently    Drug use: Never    Sexual activity: Not on file   Other Topics Concern    Not on file   Social

## 2023-12-06 NOTE — ED NOTES
Per Family complains,   - has cancer, has been taking a radioactive med for his cancer, Tuesday he started acting crazy, states he has been acting crazy  - States he has been sitting in the chair and unable to get up from his chair and now he is getting violent and aggressive with his words. - he had to be wheeled out of the cancer center Tuesday because he was unable to even stand now states he is having bursts of energy and he is \"acting crazy\" stating he was trying to leave and pack his things. - Family states that the other day he thought that her uncle was in skilled nursing and could not be convinced otherwise. States he kept \"flinching at you and getting louder yelling and   - States he is not eating much, or drinking much   - States he is post 3 days from receiving his cancer meds  - Family states he receives fluids every Tuesday and he seems to perk up and by Thursday he starts to go down hill again.    - states it starts to get worse at night between 10P-5A  - States he only pees about once a day   - States he has been having a lot of diarrhea lately      Marielena Tse  12/06/23 2106

## 2023-12-06 NOTE — H&P
Lactic Acid: No results for input(s): \"LACTA\" in the last 72 hours. BNP: No results for input(s): \"PROBNP\" in the last 72 hours. UA:  Lab Results   Component Value Date/Time    NITRU NEGATIVE 11/20/2023 07:55 AM    COLORU YELLOW 11/20/2023 07:55 AM    WBCUA <1 11/20/2023 07:55 AM    RBCUA 1 11/20/2023 07:55 AM    MUCUS RARE 11/20/2023 07:55 AM    TRICHOMONAS NONE SEEN 11/20/2023 07:55 AM    BACTERIA NEGATIVE 11/20/2023 07:55 AM    CLARITYU CLEAR 11/20/2023 07:55 AM    SPECGRAV 1.025 11/20/2023 07:55 AM    LEUKOCYTESUR NEGATIVE 11/20/2023 07:55 AM    UROBILINOGEN 0.2 11/20/2023 07:55 AM    BILIRUBINUR NEGATIVE 11/20/2023 07:55 AM    BLOODU TRACE 11/20/2023 07:55 AM    KETUA NEGATIVE 11/20/2023 07:55 AM     Urine Cultures: No results found for: \"LABURIN\"  Blood Cultures: No results found for: \"BC\"  No results found for: \"BLOODCULT2\"  Organism: No results found for: \"ORG\"    Imaging/Diagnostics Last 24 Hours   CT HEAD WO CONTRAST    Result Date: 12/6/2023  EXAMINATION: CT OF THE HEAD WITHOUT CONTRAST  12/6/2023 9:46 am TECHNIQUE: CT of the head was performed without the administration of intravenous contrast. Automated exposure control, iterative reconstruction, and/or weight based adjustment of the mA/kV was utilized to reduce the radiation dose to as low as reasonably achievable. COMPARISON: 11/06/2023. HISTORY: ORDERING SYSTEM PROVIDED HISTORY: ams TECHNOLOGIST PROVIDED HISTORY: Reason for exam:->ams Has a \"code stroke\" or \"stroke alert\" been called? ->No Decision Support Exception - unselect if not a suspected or confirmed emergency medical condition->Emergency Medical Condition (MA) Reason for Exam: ams FINDINGS: BRAIN/VENTRICLES: There is no acute intracranial hemorrhage, mass effect or midline shift. No abnormal extra-axial fluid collection. The gray-white differentiation is maintained without evidence of an acute infarct.  There is prominence of the ventricles and sulci due to global parenchymal volume

## 2023-12-06 NOTE — ED NOTES
ED TO INPATIENT SBAR HANDOFF    Patient Name: Thanh Young   :  1960  70 y.o. Preferred Name  Rizwan Ortiz Present no   Restraints no   C-SSRS:    Sitter no   Sepsis Risk Score Sepsis Risk Score: 0.67      Situation  Chief Complaint   Patient presents with    Altered Mental Status     Brief Description of Patient's Condition: Pt presenting to the ED for Altered mental status, Pt family states that it has been getting worse for the last few weeks. Mental Status: oriented, alert, coherent, logical, thought processes intact, and able to concentrate and follow conversation  Arrived from: home    Imaging:   CT HEAD WO CONTRAST   Final Result   1. No acute intracranial abnormality. XR CHEST PORTABLE   Final Result   Improvement in the small left pleural effusion with some residual atelectasis   in the left lung base.            Abnormal labs:   Abnormal Labs Reviewed   COMPREHENSIVE METABOLIC PANEL - Abnormal; Notable for the following components:       Result Value    BUN 35 (*)     Creatinine 1.8 (*)     Est, Glom Filt Rate 41 (*)     Albumin 2.9 (*)     Alkaline Phosphatase 152 (*)      (*)      (*)     All other components within normal limits   CBC - Abnormal; Notable for the following components:    RBC 4.24 (*)     Hemoglobin 11.7 (*)     Hematocrit 37.9 (*)     MCHC 30.9 (*)     All other components within normal limits   TROPONIN - Abnormal; Notable for the following components:    Troponin, High Sensitivity 37 (*)     All other components within normal limits   TROPONIN - Abnormal; Notable for the following components:    Troponin, High Sensitivity 33 (*)     All other components within normal limits   POCT GLUCOSE - Abnormal; Notable for the following components:    POC Glucose 62 (*)     All other components within normal limits       Background  History:   Past Medical History:   Diagnosis Date    History of kidney cancer     left       Assessment    Vitals: MEWS

## 2023-12-06 NOTE — ED NOTES
Medication History  [facility]    Patient Name: Simone Shields 1960     Medication history has been completed by: Veronica Duncan CPhT    Source(s) of information: patient's daughter and insurance claims     Primary Care Physician: Rommel Velez MD     Pharmacy: [facility]    Allergies as of 12/06/2023    (No Known Allergies)        Prior to Admission medications    Medication Sig Start Date End Date Taking? Authorizing Provider   mirtazapine (REMERON) 15 MG tablet Take 1 tablet by mouth nightly 12/4/23 1/3/24  Uziel Thorpe MD   methocarbamol (ROBAXIN) 500 MG tablet Take 1 tablet by mouth 2 times daily for 7 days 12/1/23 12/8/23  Uziel Thorpe MD   traMADol (ULTRAM) 50 MG tablet Take 1 tablet by mouth every 8 hours as needed for Pain for up to 30 days.  Take lowest dose possible to manage pain Max Daily Amount: 150 mg 11/30/23 12/30/23  Uziel Thorpe MD   Nutritional Supplement LIQD Take 8 fluid ounces by mouth 3 times daily 11/20/23   Uziel Thorpe MD   pantoprazole (PROTONIX) 40 MG tablet Take 1 tablet by mouth every morning (before breakfast) 11/14/23   Uziel Thorpe MD   prochlorperazine (COMPAZINE) 10 MG tablet Take 1 tablet by mouth every 8 hours as needed (nausea)  Patient not taking: Reported on 12/6/2023 11/14/23   Uziel Thorpe MD   ketorolac (TORADOL) 10 MG tablet Take 1 tablet by mouth every 6 hours as needed for Pain  Patient not taking: Reported on 12/6/2023 11/9/23 11/8/24  Juan J Martinez MD   loperamide (IMODIUM A-D) 2 MG tablet take 2 tablets by mouth immediately then 1 tablet AFTER EACH LOOSE STOOL AS DIRECTED maximum daily dose of 8 11/9/23   Uziel Thorpe MD   baclofen (LIORESAL) 10 MG tablet Take 1 tablet by mouth 2 times daily as needed (pain)  Patient not taking: Reported on 12/6/2023 8/28/23   Uziel Thorpe MD   ondansetron (ZOFRAN-ODT) 4 MG disintegrating tablet Take 1 tablet by mouth every 8 hours as needed for Nausea or

## 2023-12-06 NOTE — CARE COORDINATION
[facility] criteria for AMS reviewed at this time, criteria supports Inpatient Admission.  LYDIA,RN/CM

## 2023-12-06 NOTE — PROGRESS NOTES
4 Eyes Skin Assessment     NAME:  Simone Shields  YOB: 1960  MEDICAL RECORD NUMBER:  [id number]    The patient is being assessed for  Admission    I agree that at least one RN has performed a thorough Head to Toe Skin Assessment on the patient. ALL assessment sites listed below have been assessed. Areas assessed by both nurses:    Head, Face, Ears, Shoulders, Back, Chest, Arms, Elbows, Hands, Sacrum. Buttock, Coccyx, Ischium, Legs. Feet and Heels, and Under Medical Devices         Does the Patient have a Wound? Yes wound(s) were present on assessment.  LDA wound assessment was Initiated and completed by RN       Maninder Prevention initiated by RN: No  Wound Care Orders initiated by RN: No    Pressure Injury (Stage 3,4, Unstageable, DTI, NWPT, and Complex wounds) if present, place Wound referral order by RN under : No    New Ostomies, if present place, Ostomy referral order under : No     Nurse 1 eSignature: Electronically signed by Jack Felix LPN on 38/4/48 at 1:38 PM EST    **SHARE this note so that the co-signing nurse can place an eSignature**    Nurse 2 eSignature: Electronically signed by Rahul Vidal RN on 12/6/23 at 7:28 PM EST

## 2023-12-07 PROBLEM — E43 SEVERE MALNUTRITION (HCC): Status: ACTIVE | Noted: 2023-12-07

## 2023-12-07 PROBLEM — R41.0 ACUTE DELIRIUM: Status: ACTIVE | Noted: 2023-12-07

## 2023-12-07 LAB
ANION GAP SERPL CALCULATED.3IONS-SCNC: 11 MMOL/L (ref 4–16)
BASOPHILS ABSOLUTE: 0 K/CU MM
BASOPHILS RELATIVE PERCENT: 0.5 % (ref 0–1)
BUN SERPL-MCNC: 21 MG/DL (ref 6–23)
CALCIUM SERPL-MCNC: 7.9 MG/DL (ref 8.3–10.6)
CHLORIDE BLD-SCNC: 107 MMOL/L (ref 99–110)
CO2: 20 MMOL/L (ref 21–32)
CREAT SERPL-MCNC: 1.1 MG/DL (ref 0.9–1.3)
DIFFERENTIAL TYPE: ABNORMAL
EOSINOPHILS ABSOLUTE: 0 K/CU MM
EOSINOPHILS RELATIVE PERCENT: 0 % (ref 0–3)
GFR SERPL CREATININE-BSD FRML MDRD: >60 ML/MIN/1.73M2
GLUCOSE SERPL-MCNC: 63 MG/DL (ref 70–99)
HCT VFR BLD CALC: 33.2 % (ref 42–52)
HEMOGLOBIN: 10.1 GM/DL (ref 13.5–18)
IMMATURE NEUTROPHIL %: 0.3 % (ref 0–0.43)
LYMPHOCYTES ABSOLUTE: 1.3 K/CU MM
LYMPHOCYTES RELATIVE PERCENT: 22.4 % (ref 24–44)
MCH RBC QN AUTO: 27.8 PG (ref 27–31)
MCHC RBC AUTO-ENTMCNC: 30.4 % (ref 32–36)
MCV RBC AUTO: 91.5 FL (ref 78–100)
MONOCYTES ABSOLUTE: 0.4 K/CU MM
MONOCYTES RELATIVE PERCENT: 6.2 % (ref 0–4)
NUCLEATED RBC %: 0 %
PDW BLD-RTO: 14.9 % (ref 11.7–14.9)
PLATELET # BLD: 109 K/CU MM (ref 140–440)
PMV BLD AUTO: 9.2 FL (ref 7.5–11.1)
POTASSIUM SERPL-SCNC: 4.8 MMOL/L (ref 3.5–5.1)
RBC # BLD: 3.63 M/CU MM (ref 4.6–6.2)
SEGMENTED NEUTROPHILS ABSOLUTE COUNT: 4.1 K/CU MM
SEGMENTED NEUTROPHILS RELATIVE PERCENT: 70.6 % (ref 36–66)
SODIUM BLD-SCNC: 138 MMOL/L (ref 135–145)
T3 FREE: 2.7 PG/ML (ref 2.3–4.2)
T4 FREE SERPL-MCNC: 1.98 NG/DL (ref 0.9–1.8)
TOTAL IMMATURE NEUTOROPHIL: 0.02 K/CU MM
TOTAL NUCLEATED RBC: 0 K/CU MM
WBC # BLD: 5.9 K/CU MM (ref 4–10.5)

## 2023-12-07 PROCEDURE — 6360000002 HC RX W HCPCS: Performed by: STUDENT IN AN ORGANIZED HEALTH CARE EDUCATION/TRAINING PROGRAM

## 2023-12-07 PROCEDURE — 85025 COMPLETE CBC W/AUTO DIFF WBC: CPT

## 2023-12-07 PROCEDURE — 2700000000 HC OXYGEN THERAPY PER DAY

## 2023-12-07 PROCEDURE — 84481 FREE ASSAY (FT-3): CPT

## 2023-12-07 PROCEDURE — 6360000002 HC RX W HCPCS: Performed by: PHYSICIAN ASSISTANT

## 2023-12-07 PROCEDURE — 94664 DEMO&/EVAL PT USE INHALER: CPT

## 2023-12-07 PROCEDURE — 6370000000 HC RX 637 (ALT 250 FOR IP)

## 2023-12-07 PROCEDURE — 94761 N-INVAS EAR/PLS OXIMETRY MLT: CPT

## 2023-12-07 PROCEDURE — 1200000000 HC SEMI PRIVATE

## 2023-12-07 PROCEDURE — 36415 COLL VENOUS BLD VENIPUNCTURE: CPT

## 2023-12-07 PROCEDURE — 99223 1ST HOSP IP/OBS HIGH 75: CPT | Performed by: INTERNAL MEDICINE

## 2023-12-07 PROCEDURE — 76937 US GUIDE VASCULAR ACCESS: CPT

## 2023-12-07 PROCEDURE — 6360000002 HC RX W HCPCS: Performed by: NURSE PRACTITIONER

## 2023-12-07 PROCEDURE — 2580000003 HC RX 258: Performed by: STUDENT IN AN ORGANIZED HEALTH CARE EDUCATION/TRAINING PROGRAM

## 2023-12-07 PROCEDURE — 94640 AIRWAY INHALATION TREATMENT: CPT

## 2023-12-07 PROCEDURE — 84439 ASSAY OF FREE THYROXINE: CPT

## 2023-12-07 PROCEDURE — APPNB60 APP NON BILLABLE TIME 46-60 MINS: Performed by: PHYSICIAN ASSISTANT

## 2023-12-07 PROCEDURE — 80048 BASIC METABOLIC PNL TOTAL CA: CPT

## 2023-12-07 PROCEDURE — 6370000000 HC RX 637 (ALT 250 FOR IP): Performed by: PHYSICIAN ASSISTANT

## 2023-12-07 RX ORDER — METHOCARBAMOL 500 MG/1
500 TABLET, FILM COATED ORAL 2 TIMES DAILY
Status: DISCONTINUED | OUTPATIENT
Start: 2023-12-07 | End: 2023-12-09 | Stop reason: HOSPADM

## 2023-12-07 RX ORDER — LORAZEPAM 2 MG/ML
0.5 INJECTION INTRAMUSCULAR EVERY 6 HOURS PRN
Status: DISCONTINUED | OUTPATIENT
Start: 2023-12-07 | End: 2023-12-09 | Stop reason: HOSPADM

## 2023-12-07 RX ORDER — IPRATROPIUM BROMIDE AND ALBUTEROL SULFATE 2.5; .5 MG/3ML; MG/3ML
1 SOLUTION RESPIRATORY (INHALATION)
Status: DISCONTINUED | OUTPATIENT
Start: 2023-12-07 | End: 2023-12-08

## 2023-12-07 RX ORDER — MORPHINE SULFATE 2 MG/ML
2 INJECTION, SOLUTION INTRAMUSCULAR; INTRAVENOUS EVERY 4 HOURS PRN
Status: COMPLETED | OUTPATIENT
Start: 2023-12-07 | End: 2023-12-08

## 2023-12-07 RX ORDER — LOPERAMIDE HYDROCHLORIDE 2 MG/1
2 CAPSULE ORAL 4 TIMES DAILY
Status: DISCONTINUED | OUTPATIENT
Start: 2023-12-07 | End: 2023-12-09 | Stop reason: HOSPADM

## 2023-12-07 RX ORDER — DIPHENHYDRAMINE HYDROCHLORIDE AND LIDOCAINE HYDROCHLORIDE AND ALUMINUM HYDROXIDE AND MAGNESIUM HYDRO
5 KIT 4 TIMES DAILY PRN
Status: DISCONTINUED | OUTPATIENT
Start: 2023-12-07 | End: 2023-12-09 | Stop reason: HOSPADM

## 2023-12-07 RX ADMIN — SODIUM CHLORIDE: 9 INJECTION, SOLUTION INTRAVENOUS at 10:41

## 2023-12-07 RX ADMIN — ENOXAPARIN SODIUM 40 MG: 100 INJECTION SUBCUTANEOUS at 08:55

## 2023-12-07 RX ADMIN — PANTOPRAZOLE SODIUM 40 MG: 40 TABLET, DELAYED RELEASE ORAL at 05:29

## 2023-12-07 RX ADMIN — HYDROMORPHONE HYDROCHLORIDE 0.5 MG: 1 INJECTION, SOLUTION INTRAMUSCULAR; INTRAVENOUS; SUBCUTANEOUS at 10:38

## 2023-12-07 RX ADMIN — LOPERAMIDE HYDROCHLORIDE 2 MG: 2 CAPSULE ORAL at 12:32

## 2023-12-07 RX ADMIN — MORPHINE SULFATE 2 MG: 2 INJECTION, SOLUTION INTRAMUSCULAR; INTRAVENOUS at 21:36

## 2023-12-07 RX ADMIN — METHOCARBAMOL 500 MG: 500 TABLET ORAL at 13:17

## 2023-12-07 RX ADMIN — IPRATROPIUM BROMIDE AND ALBUTEROL SULFATE 1 DOSE: 2.5; .5 SOLUTION RESPIRATORY (INHALATION) at 07:40

## 2023-12-07 ASSESSMENT — PAIN SCALES - GENERAL
PAINLEVEL_OUTOF10: 8
PAINLEVEL_OUTOF10: 3

## 2023-12-07 ASSESSMENT — PAIN DESCRIPTION - LOCATION
LOCATION: ABDOMEN
LOCATION: NECK

## 2023-12-07 ASSESSMENT — PAIN DESCRIPTION - ORIENTATION: ORIENTATION: LEFT

## 2023-12-07 ASSESSMENT — PAIN DESCRIPTION - DESCRIPTORS: DESCRIPTORS: SHARP

## 2023-12-07 ASSESSMENT — PAIN SCALES - WONG BAKER: WONGBAKER_NUMERICALRESPONSE: 4

## 2023-12-07 NOTE — CONSULTS
Patient Name:  Ramonita Carrel  Patient :  1960  Patient MRN:  [id number]     Primary Oncologist: Dr Morena Stevenson MD   Referring Provider: Vera Evans MD     Date of Service: 2023      Reason for Consult: known to service with RCC, encephalopathy     Chief Complaint:    Chief Complaint   Patient presents with    Altered Mental Status     Principal Problem:    Acute encephalopathy  Resolved Problems:    * No resolved hospital problems. *      HPI:   Ramonita Carrel is a pleasant 70 y.o. male with a history of metastatic renal cell carcinoma initially diagnosed in 2012 status post left robotic radical nephrectomy on the left. He has biopsy confirmed lung involvement. He was previously on trial with ipi/nivo however mentioned to have progression of disease and is now on Cabometyx on trial. He has had waxing and waning mental status over the last 1-2 months, acutely worse in the last 2 weeks. He does come in for IV fluids intermittently in clinic which usually improves overall mental status but since last last week he as been more agitated, verbally aggressive, and confused. He has confused his daughter for his  wife. He is complaining of pain in worsening in his neck which is very poorly controlled, making him more agitated. He has also had uncontrolled watery diarrhea since starting Cabozantinib as well as mouth sores and odynophagia. MRI brain 2023 was negative for acute process. Noted TSH was low to 0.27 on 2023 however he dd not have any reflex, will order now. CBC is stable overall with WBC 5.9, Hgb 10, plt 109, all cell lines reduced since admission since receiving 2L fluid, likely dilutional.  He is agitated on exam, needs to get OOB for bowel movement, mumbling but not answering questions appropriately. Discussed in depth with daughter.   She does not want to pursue any cancer directed care at this point as his HCPOA as his health status has only been worse with treatment. We agree that this is reasonable. Last dose of Cabozatinib on 12/3/2023    Past Medical History:   Diagnosis Date    History of kidney cancer     left     Past Surgical History:   Procedure Laterality Date    BACK SURGERY  2020    two disks replaced    KIDNEY REMOVAL Left     kidney cancer     Social History     Tobacco Use    Smoking status: Former     Packs/day: 0.50     Years: 50.00     Additional pack years: 0.00     Total pack years: 25.00     Types: Cigarettes     Start date: 78     Quit date: 2023     Years since quittin.6    Smokeless tobacco: Never   Vaping Use    Vaping Use: Never used   Substance Use Topics    Alcohol use: Not Currently    Drug use: Never        Family History   Problem Relation Age of Onset    Heart Disease Mother     Heart Disease Father                                                                                              No Known Allergies    No current facility-administered medications on file prior to encounter. Current Outpatient Medications on File Prior to Encounter   Medication Sig Dispense Refill    mirtazapine (REMERON) 15 MG tablet Take 1 tablet by mouth nightly 30 tablet 0    traMADol (ULTRAM) 50 MG tablet Take 1 tablet by mouth every 8 hours as needed for Pain for up to 30 days.  Take lowest dose possible to manage pain Max Daily Amount: 150 mg 90 tablet 0    Nutritional Supplement LIQD Take 8 fluid ounces by mouth 3 times daily 90 each 11    pantoprazole (PROTONIX) 40 MG tablet Take 1 tablet by mouth every morning (before breakfast) 90 tablet 1    ketorolac (TORADOL) 10 MG tablet Take 1 tablet by mouth every 6 hours as needed for Pain 20 tablet 0    loperamide (IMODIUM A-D) 2 MG tablet take 2 tablets by mouth immediately then 1 tablet AFTER EACH LOOSE STOOL AS DIRECTED maximum daily dose of 8 90 tablet 0    simvastatin (ZOCOR) 40 MG tablet take 1 tablet by mouth once daily  0      Review of Systems:  Unable to perform

## 2023-12-07 NOTE — PROGRESS NOTES
[place]  [facility] [place]      Name:  Roxanna Salinas /Age/Sex: 1960  (70 y.o. male)   MRN & CSN:  [id number] & [id number] Encounter Date/Time: 2023 4:49 PM EST    Location:  [address] [place] [address]- PCP: Jada Argueta MD       Hospital Day: 2    Assessment and Plan:   Roxanna Salinas is a 70 y.o. male with pmh as noted below who presents with Acute encephalopathy      Plan:  Acute encephalopathy      -Etiology unclear, likely metabolic from dehydration, rule out brain mets       -CT head no acute abnormality       -UA unremarkable         -Continue adequate hydration  Delirium precautions   -frequent reorientation, avoid stimuli at night, keep vitals minimal at night, open blinds during the day, avoids lines and catheters if possible, bowel regimins, scheduled Tylenol for pain control, as needed at bedtime for sleep, avoid benzodiazepines and antipsychotics as much as possible                  CHAIM-resolved   -Creatinine on presentation 1.8-->1.1  -Adequate hydration  -Avoid nephrotoxins   -Monitor electrolytes      History of renal cell carcinoma with lung involvement  Initially diagnosed in  s/p left robotic radical nephrectomy  Was found to have likely lung involvement  CTA chest done on 2023. IR lung biopsy showed metastatic renal cell carcinoma  -On cabozantinib phase 3 trial which has been held for the past 3 days according to daughter   -Oncology following, per discussion with CM and nursing staff, \"patient;s daughter requesting DNR status, home with hospice. \" [facility] document signed per IM. -MRI Brain non acute    Diet ADULT ORAL NUTRITION SUPPLEMENT; Breakfast, Lunch, Dinner; Standard High Calorie/High Protein Oral Supplement  ADULT DIET;  Regular; applesauce, pudding, jellos   DVT Prophylaxis [] Lovenox, []  Heparin, [] SCDs, [] Ambulation,  [] Eliquis, [] Xarelto  [] Coumadin   Code Status Full Code   Disposition From: Home  Expected Disposition: Home with

## 2023-12-07 NOTE — CARE COORDINATION
CM received report from [employer]. Per report dtr given hospice options for dtr to review. CM called Vangie pts dtr and left a VM to discuss discharge planning for her dad. CM left her CM contact information. [address] [facility] here and paperwork signed for hospice. Plan is for pt to return to dtrs with [facility] to follow.   Goal for pt per dtr is for pt to die at home. Dtr would like to take him home soon. CM discussed with hospice nurses. [facility] [place]  [address] CM informed per hospice that they do not accept pts insurance but they are working on a one time contract.  [facility] [place]

## 2023-12-07 NOTE — PROGRESS NOTES
Comprehensive Nutrition Assessment    Type and Reason for Visit:  Initial (Low BMI for age)    Nutrition Recommendations/Plan:   Consider SLP eval for swallow for any diet downgrade  Continue 1:1 Assist Feed as able   Continue IVF delivery for hydration   Begin Standard High Calorie oral nutrition supplement TID   Please document all PO intakes in I/O   If pt's code status changes, consider comfort care measures. Malnutrition Assessment:  Malnutrition Status:  Severe malnutrition (12/07/23 1228)    Context:  Acute Illness     Findings of the 6 clinical characteristics of malnutrition:  Energy Intake:  75% or less of estimated energy requirements for 7 or more days (over 1+ months)  Weight Loss:  Greater than 7.5% over 3 months (~15% in 2 months)     Body Fat Loss:  Mild body fat loss (moderate ribs) Orbital, Triceps, Fat Overlying Ribs   Muscle Mass Loss:  Mild muscle mass loss Clavicles (pectoralis & deltoids), Temples (temporalis)  Fluid Accumulation:  No significant fluid accumulation Extremities   Strength:  Not Performed    Nutrition Assessment:    Admitted with Acute Encephalopathy. H/O Tobacco Abuse, Benign Neoplasm of Ascending colon, RCC w/ lung mass, s/p lt nephrectomy 2012, Neck spasm/pain, recent hospitalization with CHAIM last month, met criteria for malnutrition. Progressive weight loss since last month, 15%/23lb in 2 months. Hx of difficulty swallowing over the past months. Pt appears confused yet able to answer questions softly, pt reports swallow is better yet only consuming Ensure, soup, puddings, jellos, and applesauce at home. Dtr not present at visit. Spoke with nsg staff, noted decrease po intake pta and pocketing foods. Performed NFPE, mild to moderate wasting noted. Continues to meet criteria for malnutrition. High nutrition risk.     Nutrition Related Findings:    Elevated LFTs, GFR 41, NS @ 75 ml/hr, +remeron, haldol +confusion +possible plans for hospice, not pursuing chemo tx Wound

## 2023-12-07 NOTE — CARE COORDINATION
Pt's daughter Troy Trevino spoke with this CM at the desk. She stated that the pt is confused and has a sitter. She said that they are no longer going to pursue chemo and life extending options. She had questions about hospice and wanted hospice choices. CM gave pt's dtr list of hospice choices and explained the difference between inpt and outpt hospice. Troy Trevino confirmed that they want to pursue outpt hospice options. CM to follow up with pt's dtr later today for hospice choice. Troy Trevino is very concerned about being kept updated. She requests to be kept updated about the pt's care. PS sent to LAINA [facility]cinas requesting hospice consult.

## 2023-12-08 PROCEDURE — 6370000000 HC RX 637 (ALT 250 FOR IP): Performed by: GENERAL PRACTICE

## 2023-12-08 PROCEDURE — 94761 N-INVAS EAR/PLS OXIMETRY MLT: CPT

## 2023-12-08 PROCEDURE — 6370000000 HC RX 637 (ALT 250 FOR IP): Performed by: NURSE PRACTITIONER

## 2023-12-08 PROCEDURE — 6360000002 HC RX W HCPCS: Performed by: PHYSICIAN ASSISTANT

## 2023-12-08 PROCEDURE — 94640 AIRWAY INHALATION TREATMENT: CPT

## 2023-12-08 PROCEDURE — 6370000000 HC RX 637 (ALT 250 FOR IP): Performed by: INTERNAL MEDICINE

## 2023-12-08 PROCEDURE — 1200000000 HC SEMI PRIVATE

## 2023-12-08 PROCEDURE — 2700000000 HC OXYGEN THERAPY PER DAY

## 2023-12-08 PROCEDURE — 6370000000 HC RX 637 (ALT 250 FOR IP): Performed by: STUDENT IN AN ORGANIZED HEALTH CARE EDUCATION/TRAINING PROGRAM

## 2023-12-08 PROCEDURE — 6360000002 HC RX W HCPCS: Performed by: STUDENT IN AN ORGANIZED HEALTH CARE EDUCATION/TRAINING PROGRAM

## 2023-12-08 PROCEDURE — 6360000002 HC RX W HCPCS: Performed by: NURSE PRACTITIONER

## 2023-12-08 PROCEDURE — 6370000000 HC RX 637 (ALT 250 FOR IP): Performed by: PHYSICIAN ASSISTANT

## 2023-12-08 PROCEDURE — 2580000003 HC RX 258: Performed by: STUDENT IN AN ORGANIZED HEALTH CARE EDUCATION/TRAINING PROGRAM

## 2023-12-08 RX ORDER — OXYCODONE HYDROCHLORIDE 5 MG/1
5 TABLET ORAL ONCE
Status: COMPLETED | OUTPATIENT
Start: 2023-12-08 | End: 2023-12-08

## 2023-12-08 RX ORDER — SCOLOPAMINE TRANSDERMAL SYSTEM 1 MG/1
1 PATCH, EXTENDED RELEASE TRANSDERMAL
Status: DISCONTINUED | OUTPATIENT
Start: 2023-12-08 | End: 2023-12-09 | Stop reason: HOSPADM

## 2023-12-08 RX ORDER — IPRATROPIUM BROMIDE AND ALBUTEROL SULFATE 2.5; .5 MG/3ML; MG/3ML
1 SOLUTION RESPIRATORY (INHALATION) EVERY 4 HOURS PRN
Status: DISCONTINUED | OUTPATIENT
Start: 2023-12-08 | End: 2023-12-09 | Stop reason: HOSPADM

## 2023-12-08 RX ORDER — MORPHINE SULFATE 20 MG/ML
5 SOLUTION ORAL EVERY 4 HOURS PRN
Status: DISCONTINUED | OUTPATIENT
Start: 2023-12-08 | End: 2023-12-09 | Stop reason: HOSPADM

## 2023-12-08 RX ORDER — GLYCOPYRROLATE 0.2 MG/ML
0.2 INJECTION INTRAMUSCULAR; INTRAVENOUS ONCE
Status: CANCELLED | OUTPATIENT
Start: 2023-12-08 | End: 2023-12-08

## 2023-12-08 RX ADMIN — OXYCODONE HYDROCHLORIDE 5 MG: 5 TABLET ORAL at 08:41

## 2023-12-08 RX ADMIN — LOPERAMIDE HYDROCHLORIDE 2 MG: 2 CAPSULE ORAL at 13:03

## 2023-12-08 RX ADMIN — LORAZEPAM 0.5 MG: 2 INJECTION INTRAMUSCULAR; INTRAVENOUS at 21:25

## 2023-12-08 RX ADMIN — SODIUM CHLORIDE, PRESERVATIVE FREE 10 ML: 5 INJECTION INTRAVENOUS at 13:04

## 2023-12-08 RX ADMIN — METHOCARBAMOL 500 MG: 500 TABLET ORAL at 08:41

## 2023-12-08 RX ADMIN — Medication 5 MG: at 22:13

## 2023-12-08 RX ADMIN — SODIUM CHLORIDE: 9 INJECTION, SOLUTION INTRAVENOUS at 04:02

## 2023-12-08 RX ADMIN — LOPERAMIDE HYDROCHLORIDE 2 MG: 2 CAPSULE ORAL at 08:41

## 2023-12-08 RX ADMIN — ENOXAPARIN SODIUM 40 MG: 100 INJECTION SUBCUTANEOUS at 08:43

## 2023-12-08 RX ADMIN — LORAZEPAM 0.5 MG: 2 INJECTION INTRAMUSCULAR; INTRAVENOUS at 15:12

## 2023-12-08 RX ADMIN — IPRATROPIUM BROMIDE AND ALBUTEROL SULFATE 1 DOSE: 2.5; .5 SOLUTION RESPIRATORY (INHALATION) at 07:03

## 2023-12-08 RX ADMIN — Medication 5 MG: at 13:03

## 2023-12-08 RX ADMIN — Medication 5 MG: at 18:07

## 2023-12-08 RX ADMIN — ACETAMINOPHEN 650 MG: 650 SUPPOSITORY RECTAL at 02:59

## 2023-12-08 RX ADMIN — MORPHINE SULFATE 2 MG: 2 INJECTION, SOLUTION INTRAMUSCULAR; INTRAVENOUS at 04:01

## 2023-12-08 ASSESSMENT — PAIN SCALES - GENERAL: PAINLEVEL_OUTOF10: 0

## 2023-12-08 NOTE — CARE COORDINATION
[facility] spoke with this CM. She stated that they will be delivering the pt's equipment today so they will be able to accept the pt tomorrow into their services. [facility] stated that they will not be able to set up transport for the pt to get home. CM to communicate with attending and with nursing staff about discharging and transport.

## 2023-12-08 NOTE — PROGRESS NOTES
V2.0  [facility] [place]      Name:  Lalit Hunter /Age/Sex: 1960  (70 y.o. male)   MRN & CSN:  [id number] & [id number] Encounter Date/Time: 2023 4:49 PM EST    Location:  [address] [place] [address] PCP: Otto Kamara MD       Hospital Day: 3    Assessment and Plan:   Lalit Hunter is a 70 y.o. male with pmh as noted below who presents with Acute encephalopathy      Plan:  Acute encephalopathy      -Etiology unclear, likely metabolic from dehydration, rule out brain mets       -CT head no acute abnormality       -UA unremarkable         -Continue adequate hydration  Delirium precautions   -frequent reorientation, avoid stimuli at night, keep vitals minimal at night, open blinds during the day, avoids lines and catheters if possible, bowel regimins, scheduled Tylenol for pain control, as needed at bedtime for sleep, avoid benzodiazepines and antipsychotics as much as possible   -Hospice board likely to go for home hospice today versus tomorrow                 CHAIM-resolved   -Creatinine on presentation 1.8-->1.1  -Adequate hydration  -Avoid nephrotoxins   -Monitor electrolytes      History of renal cell carcinoma with lung involvement  Initially diagnosed in  s/p left robotic radical nephrectomy  Was found to have likely lung involvement  CTA chest done on 2023. IR lung biopsy showed metastatic renal cell carcinoma  -On cabozantinib phase 3 trial which has been held for the past 3 days according to daughter   -Oncology following, per discussion with CM and nursing staff, \"patient;s daughter requesting DNR status, home with hospice. \" [facility] signed per IM. -MRI Brain non acute    Diet ADULT ORAL NUTRITION SUPPLEMENT; Breakfast, Lunch, Dinner; Standard High Calorie/High Protein Oral Supplement  ADULT DIET;  Regular; applesauce, pudding, jellos   DVT Prophylaxis [] Lovenox, []  Heparin, [] SCDs, [] Ambulation,  [] Eliquis, [] Xarelto  [] Coumadin   Code Status DNR-CC emergency medical condition->Emergency Medical Condition (MA) Reason for Exam: ams FINDINGS: BRAIN/VENTRICLES: There is no acute intracranial hemorrhage, mass effect or midline shift. No abnormal extra-axial fluid collection. The gray-white differentiation is maintained without evidence of an acute infarct. There is prominence of the ventricles and sulci due to global parenchymal volume loss. There are nonspecific areas of hypoattenuation within the periventricular and subcortical white matter, which likely represent chronic microvascular ischemic change. ORBITS: The visualized portion of the orbits demonstrate no acute abnormality. SINUSES: The visualized paranasal sinuses and mastoid air cells demonstrate no acute abnormality. There is mucosal disease involving the frontal and ethmoid sinuses. SOFT TISSUES/SKULL: No acute abnormality of the visualized skull or soft tissues. 1.No acute intracranial abnormality. XR CHEST PORTABLE    Result Date: 12/6/2023  EXAMINATION: ONE XRAY VIEW OF THE CHEST 12/6/2023 8:54 am COMPARISON: 11/06/2023 HISTORY: ORDERING SYSTEM PROVIDED HISTORY: ams TECHNOLOGIST PROVIDED HISTORY: Reason for exam:->ams Reason for Exam: ams FINDINGS: There has been improvement in the the small left pleural effusion with some residual atelectasis in the the left lung base. The right lung appears clear. There is no evidence of CHF or pneumothorax. The heart and mediastinal structures are unremarkable. Bony thorax appears normal.     Improvement in the small left pleural effusion with some residual atelectasis in the left lung base.        Electronically signed by Brock Gooden MD on 12/8/2023 at 10:18 AM

## 2023-12-08 NOTE — PROGRESS NOTES
Pt refusing oral meds at this time. IV morphine Q4 prn ordered for pt. Pt daughter would like to discuss with Hospice what medications will be given for care at home.

## 2023-12-08 NOTE — CARE COORDINATION
Received call from [facility] with [facility]. Ceasar Leyva is working on obtaining one time contract with patients insurance. [facility] stated that patient insurance requested more information from Dr. Andre Benedict so they are waiting for him to reach out to insurance. [facility] asked for update on patient regarding oral medication intake as patients daughter, Jose Bueno, would like to take patient home with hospice once equipment is delivered. Per RN Aditya Azar, patient is currently taking oral medications but did not overnight. This RN manager requested that Aditya Azar ask Dr. Deonna Diaz to reach out to Dr. Pratik Rapp to see if regime that patient will follow at home can be started [facility] to ensure that patient will be comfortable and managed well once pt is home. Ceasar Leyva will begin setting up equipment for home. RN manager asked for clarification regarding equipment delivery if they are unable to obtain one time contract. Per [facility], the [facility]  stated they will continue services even if contract is not granted from patient insurance. RN manager updated patient , Teetee Deal with information. RN manager also provided Francesca's contact information to [facility] for communication regarding equipment.

## 2023-12-08 NOTE — CARE COORDINATION
[address] RN: Pt plan is home with [facility]. Should the pt need transportation and is discharging over the weekend, please call Cookie Becerril [phone]. Please also call [facility] and inform them when the pt is discharging [phone]. Thank you.

## 2023-12-09 VITALS
SYSTOLIC BLOOD PRESSURE: 125 MMHG | WEIGHT: 131 LBS | OXYGEN SATURATION: 90 % | BODY MASS INDEX: 17.74 KG/M2 | TEMPERATURE: 103.4 F | HEIGHT: 72 IN | HEART RATE: 105 BPM | RESPIRATION RATE: 18 BRPM | DIASTOLIC BLOOD PRESSURE: 76 MMHG

## 2023-12-09 PROCEDURE — 6370000000 HC RX 637 (ALT 250 FOR IP): Performed by: STUDENT IN AN ORGANIZED HEALTH CARE EDUCATION/TRAINING PROGRAM

## 2023-12-09 RX ADMIN — ACETAMINOPHEN 650 MG: 650 SUPPOSITORY RECTAL at 01:45

## 2023-12-09 NOTE — DEATH NOTES
Death Pronouncement Note    Patient's Name: Светлана Bradford   Patient's YOB: 1960  MRN Number: [id number]    Admitting Provider: Fatuma Wise MD  Attending Provider: Abhishek Mar MD    Paged by nursing staff to pronounce that patient, Mr. Светлана Bradford, had . On exam the patient did not respond to verbal or physical stimuli. No spontaneous movements were present. There was no response to verbal or tactile stimuli. Pupils were mid-dilated and fixed. No heart sounds were heard throughout the entire precordium. No carotid/radial pulses were palpable. No breath sounds were appreciated over the entire lung filed. Time of death: 2:40 AM on 23. Family notified at bedside and all questions/concerns were addressed in detail, condolences were given.      Preliminary cause of death is cardiopulmonary arrest.    Jules Flores MD  23

## 2023-12-12 NOTE — DISCHARGE SUMMARY
V2.0  Discharge Summary    Name:  Jude Chester /Age/Sex: 1960 (70 y.o. male)   Admit Date: 2023  Discharge Date: 23    MRN & CSN:  [id number] & [id number] Encounter Date and Time 23 5:21 PM EST    Attending:  No att. providers found Discharging Provider: Zackary Stein MD       Hospital Course:       Brief Problem Based Course:   Jude Chester is a 70 y.o. male with pmh as noted below who presents with Acute encephalopathy        Plan:  Acute encephalopathy      -Etiology unclear, likely metabolic from dehydration, rule out brain mets       -CT head no acute abnormality       -UA unremarkable       -Plan was for Delirium precautions   -frequent reorientation, avoid stimuli at night, keep vitals minimal at night, open blinds during the day, avoids lines and catheters if possible, bowel regimins, scheduled Tylenol for pain control, as needed at bedtime for sleep, avoid benzodiazepines and antipsychotics as much as possible   -Patient was supposed to go to hospice. Patient was declared overnight by my colleague Dr Jim Snyder at  1095 on 2023                 CHAIM-resolved   -Creatinine on presentation 1.8-->1.1      History of renal cell carcinoma with lung involvement  Initially diagnosed in  s/p left robotic radical nephrectomy  Was found to have likely lung involvement  CTA chest done on 2023. IR lung biopsy showed metastatic renal cell carcinoma  -On cabozantinib phase 3 trial which has been held for the past 3 days according to daughter   -Oncology followed, per discussion with CM and nursing staff, \"patient;s daughter requested DNR status, home with hospice. \" [facility] document signed per IM. -MRI Brain non acute           The patient expressed appropriate understanding of, and agreement with the discharge recommendations, medications, and plan.      Consults this admission:  IP CONSULT TO ONCOLOGY  IP CONSULT TO IV TEAM  IP CONSULT TO HOSPICE    Discharge Diagnosis:   Acute

## 2023-12-13 NOTE — PROGRESS NOTES
Physician Progress Note      PATIENT:               Daphney Turcios  [facility] #:                  [id number]  :                       1960  ADMIT DATE:       2023 7:57 AM  DISCH DATE:        2023 4:02 AM  RESPONDING  PROVIDER #:        Ole Lomax MD          QUERY TEXT:    Pt admitted with encephalopathy documented POA. If possible, please document   in progress notes and discharge summary further specificity regarding the type   of encephalopathy:    The medical record reflects the following:  Risk Factors: CHAIM, Cancer-Metastatic RCC, Malnourished, Chemotherapy Related   Diarrhea  Clinical Indicators: Acute encephalopathy-Etiology unclear, likely metabolic   from dehydration, BUN 35, Creatinine 1.8, GFR 41  Treatment: Nutrition consult, Hospice consult, Oncology consult    Thank you, Jose Jenkins RN, CDS [phone]  Options provided:  -- Metabolic encephalopathy  -- Other - I will add my own diagnosis  -- Disagree - Not applicable / Not valid  -- Disagree - Clinically unable to determine / Unknown  -- Refer to Clinical Documentation Reviewer    PROVIDER RESPONSE TEXT:    This patient has metabolic encephalopathy. Query created by: Jose Jenkins on 2023 2:57 PM      QUERY TEXT:    Pt admitted with encephalopathy. Pt noted to have CHAIM, Cancer-Metastatic RCC,   Malnourished, Chemotherapy Related Diarrhea and dehydration. If possible,   please document in progress notes and discharge summary the relationship, if   any, between encephalopathy and CHAIM, Cancer-Metastatic RCC, Malnourishment,   Chemotherapy Related Diarrhea and dehydration.     The medical record reflects the following:  Risk Factors: CHAIM, Cancer-Metastatic RCC, Malnourished, Chemotherapy Related   Diarrhea  Clinical Indicators: Acute encephalopathy-Etiology unclear, likely metabolic   from dehydration, BUN 35, Creatinine 1.8, GFR 41  Treatment: Nutrition consult, Hospice consult, Oncology consult    Thank you, Jose Jenkins RN, CDS

## 2024-01-02 ENCOUNTER — TELEPHONE (OUTPATIENT)
Dept: ONCOLOGY | Age: 64
End: 2024-01-02

## 2024-01-02 NOTE — TELEPHONE ENCOUNTER
Called [facility] to facilitate return of research medication. Unable to reach, left message with return number.

## 2024-01-04 ENCOUNTER — TELEPHONE (OUTPATIENT)
Dept: ONCOLOGY | Age: 64
End: 2024-01-04

## 2024-01-17 ENCOUNTER — TELEPHONE (OUTPATIENT)
Dept: INFUSION THERAPY | Age: 64
End: 2024-01-17

## 2024-01-17 NOTE — TELEPHONE ENCOUNTER
Called [facility] to facilitate return of research medication. Unable to reach, left message with return number

## 2025-02-18 NOTE — PROGRESS NOTES
CARIS request faxed to [employer] with all required documentation. Confirmation fax received. CBC order is for 2 wks out on 3/4/25 which is the day of the surgery. Please advise if you would like it to be done sooner/before sx.